# Patient Record
Sex: FEMALE | Race: WHITE | Employment: OTHER | URBAN - METROPOLITAN AREA
[De-identification: names, ages, dates, MRNs, and addresses within clinical notes are randomized per-mention and may not be internally consistent; named-entity substitution may affect disease eponyms.]

---

## 2017-08-17 ENCOUNTER — HOSPITAL ENCOUNTER (OUTPATIENT)
Dept: MAMMOGRAPHY | Age: 72
Discharge: HOME | End: 2017-08-17
Attending: FAMILY MEDICINE
Payer: MEDICARE

## 2017-08-17 DIAGNOSIS — Z12.31 VISIT FOR SCREENING MAMMOGRAM: ICD-10-CM

## 2017-08-17 DIAGNOSIS — Z78.0 ASYMPTOMATIC MENOPAUSAL STATE: ICD-10-CM

## 2017-08-17 PROCEDURE — 77080 DXA BONE DENSITY AXIAL: CPT

## 2017-08-17 PROCEDURE — 77067 SCR MAMMO BI INCL CAD: CPT

## 2019-10-01 LAB — LDL-C, EXTERNAL: 65

## 2020-07-22 RX ORDER — AMLODIPINE BESYLATE 5 MG/1
TABLET ORAL
Qty: 90 TAB | Refills: 0 | Status: SHIPPED | OUTPATIENT
Start: 2020-07-22 | End: 2020-10-15

## 2020-08-04 RX ORDER — VALACYCLOVIR HYDROCHLORIDE 500 MG/1
TABLET, FILM COATED ORAL
Qty: 18 TAB | Refills: 0 | Status: SHIPPED | OUTPATIENT
Start: 2020-08-04 | End: 2020-10-02

## 2020-08-10 RX ORDER — DOXAZOSIN 2 MG/1
TABLET ORAL
Qty: 90 TAB | Refills: 0 | Status: SHIPPED | OUTPATIENT
Start: 2020-08-10 | End: 2020-11-04

## 2020-08-10 NOTE — TELEPHONE ENCOUNTER
Overdue for six-month follow-up of chronic conditions and meds, nonfasting labs. Virtual visit is okay.

## 2020-08-12 ENCOUNTER — OFFICE VISIT (OUTPATIENT)
Dept: FAMILY MEDICINE CLINIC | Age: 75
End: 2020-08-12
Payer: MEDICARE

## 2020-08-12 VITALS
OXYGEN SATURATION: 98 % | HEART RATE: 78 BPM | BODY MASS INDEX: 32.16 KG/M2 | SYSTOLIC BLOOD PRESSURE: 138 MMHG | WEIGHT: 188.38 LBS | DIASTOLIC BLOOD PRESSURE: 70 MMHG | HEIGHT: 64 IN | TEMPERATURE: 98.4 F | RESPIRATION RATE: 16 BRPM

## 2020-08-12 VITALS
HEIGHT: 64 IN | BODY MASS INDEX: 29.84 KG/M2 | SYSTOLIC BLOOD PRESSURE: 162 MMHG | OXYGEN SATURATION: 96 % | DIASTOLIC BLOOD PRESSURE: 90 MMHG | RESPIRATION RATE: 12 BRPM | TEMPERATURE: 97.1 F | HEART RATE: 95 BPM | WEIGHT: 174.8 LBS

## 2020-08-12 DIAGNOSIS — E53.8 COBALAMIN DEFICIENCY: ICD-10-CM

## 2020-08-12 DIAGNOSIS — I10 ESSENTIAL HYPERTENSION: Primary | ICD-10-CM

## 2020-08-12 DIAGNOSIS — K58.9 IRRITABLE BOWEL SYNDROME WITHOUT DIARRHEA: ICD-10-CM

## 2020-08-12 DIAGNOSIS — E03.9 ACQUIRED HYPOTHYROIDISM: ICD-10-CM

## 2020-08-12 DIAGNOSIS — N39.3 STRESS INCONTINENCE OF URINE: ICD-10-CM

## 2020-08-12 PROBLEM — R32 URINARY INCONTINENCE: Status: ACTIVE | Noted: 2020-08-12

## 2020-08-12 PROBLEM — M46.1 INFLAMMATION OF SACROILIAC JOINT (HCC): Status: ACTIVE | Noted: 2017-03-13

## 2020-08-12 PROBLEM — L21.9 SEBORRHEIC DERMATITIS OF SCALP: Status: ACTIVE | Noted: 2020-08-12

## 2020-08-12 PROBLEM — B00.1 HERPES LABIALIS: Status: ACTIVE | Noted: 2020-08-12

## 2020-08-12 PROBLEM — L70.9 ACNE: Status: ACTIVE | Noted: 2020-08-12

## 2020-08-12 PROBLEM — C92.91: Status: ACTIVE | Noted: 2020-08-12

## 2020-08-12 PROBLEM — K29.40 ATROPHIC GASTRITIS: Status: ACTIVE | Noted: 2020-08-12

## 2020-08-12 PROBLEM — M76.829 POSTERIOR TIBIAL TENDINITIS: Status: ACTIVE | Noted: 2017-08-21

## 2020-08-12 PROBLEM — E66.9 OBESITY: Status: ACTIVE | Noted: 2020-08-12

## 2020-08-12 PROBLEM — D51.0 PERNICIOUS ANEMIA: Status: ACTIVE | Noted: 2020-08-12

## 2020-08-12 PROBLEM — N18.30 CHRONIC KIDNEY DISEASE, STAGE 3 (HCC): Status: ACTIVE | Noted: 2020-08-12

## 2020-08-12 PROBLEM — A60.00 GENITAL HERPES SIMPLEX: Status: ACTIVE | Noted: 2020-08-12

## 2020-08-12 PROBLEM — B37.2 CANDIDIASIS OF SKIN: Status: ACTIVE | Noted: 2020-08-12

## 2020-08-12 PROBLEM — M53.3 DISORDER OF SACROILIAC JOINT: Status: ACTIVE | Noted: 2017-03-13

## 2020-08-12 PROBLEM — R01.1 HEART MURMUR: Status: ACTIVE | Noted: 2020-08-12

## 2020-08-12 PROBLEM — L12.9 PEMPHIGOID: Status: ACTIVE | Noted: 2020-08-12

## 2020-08-12 PROCEDURE — 99214 OFFICE O/P EST MOD 30 MIN: CPT | Performed by: NURSE PRACTITIONER

## 2020-08-12 RX ORDER — DASATINIB 100 MG/1
TABLET ORAL
COMMUNITY
Start: 2018-10-22

## 2020-08-12 RX ORDER — OXYBUTYNIN CHLORIDE 5 MG/1
TABLET ORAL
COMMUNITY
Start: 2018-11-10 | End: 2020-10-15

## 2020-08-12 RX ORDER — CLINDAMYCIN HYDROCHLORIDE 300 MG/1
300 CAPSULE ORAL 3 TIMES DAILY
COMMUNITY
End: 2020-08-12

## 2020-08-12 RX ORDER — PAROXETINE HYDROCHLORIDE 20 MG/1
TABLET, FILM COATED ORAL
COMMUNITY
End: 2022-02-09 | Stop reason: ALTCHOICE

## 2020-08-12 RX ORDER — CALCIUM CARB/VITAMIN D3/VIT K1 500-100-40
TABLET,CHEWABLE ORAL
COMMUNITY

## 2020-08-12 NOTE — PROGRESS NOTES
Subjective  Chief Complaint   Patient presents with    Follow Up Chronic Condition    Medication Evaluation     HPI:  Cristofer Hunter is a 66 y.o. female. Patient presents for management of hypertension, hypothyroidism, vit B12 deficiency, IBS, and urinary incontinence. Medications reviewed, taking as prescribed with no known side effects. Reported home BP readings 130s/70s. Not exercising regularly. Limits salt, drinks mostly water. Levothyroxine daily in am on empty stomach, alone with sip of water. Continues with self injection of B12 first of every month. IBS is well controlled with Bentyl once daily. Urinary incontinence is well controlled with daily medication. Follows with oncology for Paxil and Sprycel.     Past Medical History:   Diagnosis Date    Acne 8/12/2020    Acquired hypothyroidism 8/12/2020    Atrophic gastritis 8/12/2020    Candidiasis of skin 8/12/2020    Chronic kidney disease, stage 3 ([facility] [address] 8/12/2020    Cobalamin deficiency 8/12/2020    Essential hypertension 8/12/2020    Fracture     left hip/ coccyx    Genital herpes simplex 8/12/2020    Heart murmur 8/12/2020    Herpes labialis 8/12/2020    Hypertension     Hypothyroid     Irritable bowel syndrome 8/12/2020    Myeloid leukemia in remission ([facility] [address] 8/12/2020    Obesity 8/12/2020    Pemphigoid 8/12/2020    Pernicious anemia 8/12/2020    Seborrheic dermatitis of scalp 8/12/2020    Urinary incontinence 8/12/2020     Family History   Problem Relation Age of Onset    Breast Cancer Sister     Hypertension Sister     Diabetes Mother     Cancer Father      Social History     Socioeconomic History    Marital status:      Spouse name: Not on file    Number of children: Not on file    Years of education: Not on file    Highest education level: Not on file   Occupational History    Not on file   Social Needs    Financial resource strain: Not on file    Food insecurity     Worry: Not on file     Inability: Not on file   Can'tWait needs     Medical: Not on file     Non-medical: Not on file   Tobacco Use    Smoking status: Never Smoker    Smokeless tobacco: Never Used   Substance and Sexual Activity    Alcohol use: Not Currently    Drug use: No    Sexual activity: Not Currently   Lifestyle    Physical activity     Days per week: Not on file     Minutes per session: Not on file    Stress: Not on file   Relationships    Social connections     Talks on phone: Not on file     Gets together: Not on file     Attends [employer] service: Not on file     Active member of club or organization: Not on file     Attends meetings of clubs or organizations: Not on file     Relationship status: Not on file    Intimate partner violence     Fear of current or ex partner: Not on file     Emotionally abused: Not on file     Physically abused: Not on file     Forced sexual activity: Not on file   Other Topics Concern    Not on file   Social History Narrative    Not on file     Current Outpatient Medications on File Prior to Visit   Medication Sig Dispense Refill    dasatinib (SpryceL) 100 mg tab Sprycel 100 mg tablet      oxybutynin (DITROPAN) 5 mg tablet TK 1 T PO BID FOR URINARY CONTINENCE      Insulin Syringe-Needle U-100 1 mL 31 gauge x 5/16 syrg insulin syringe U-100 with needle 1 mL 31 gauge x 5/16\"      PARoxetine (PAXIL) 20 mg tablet paroxetine 20 mg tablet      [DISCONTINUED] clindamycin (CLEOCIN) 300 mg capsule Take 300 mg by mouth three (3) times daily.  doxazosin (CARDURA) 2 mg tablet TAKE 1 TABLET BY MOUTH EVERY NIGHT AT BEDTIME 90 Tab 0    valACYclovir (VALTREX) 500 mg tablet TAKE 1 TABLET BY MOUTH TWICE DAILY FOR 3 DAYS. TOTAL OF 6 DOSES PER OUTBREAK 18 Tab 0    amLODIPine (NORVASC) 5 mg tablet TAKE 1 TABLET BY MOUTH EVERY DAY 90 Tab 0    levothyroxine (SYNTHROID) 125 mcg tablet Take 125 mcg by mouth daily (before breakfast).       dicyclomine (BENTYL) 10 mg capsule Take 10 mg by mouth two (2) times a day.      cyanocobalamin (VITAMIN B-12) 1,000 mcg/mL injection 1,000 mcg by IntraMUSCular route every thirty (30) days.  [DISCONTINUED] liothyronine (CYTOMEL) 25 mcg tablet Take 12.5 mcg by mouth as directed. Every other day       [DISCONTINUED] citalopram (CELEXA) 20 mg tablet Take 10 mg by mouth daily.  [DISCONTINUED] lorazepam (ATIVAN) 0.5 mg tablet Take 0.5 mg by mouth daily.  [DISCONTINUED] gabapentin (NEURONTIN) 300 mg capsule Take 100 mg by mouth three (3) times daily.  [DISCONTINUED] oxycodone-acetaminophen (PERCOCET) 5-325 mg per tablet Take 1 Tab by mouth two (2) times a day.  [DISCONTINUED] CETIRIZINE HCL (ZYRTEC PO) Take 1 Tab by mouth daily.  [DISCONTINUED] cholecalciferol, vitamin d3, (VITAMIN D) 1,000 unit tablet Take 1,000 Units by mouth daily.  [DISCONTINUED] TRIAMTERENE PO Take 37.5 mg by mouth daily.  [DISCONTINUED] prednisone (STERAPRED DS) 10 mg dose pack Take  by mouth. See administration instruction per 10mg dose pack 48 Tab 0     No current facility-administered medications on file prior to visit. Allergies   Allergen Reactions    Adacel [Diphth,Pertus(Acell),Tetanus] Other (comments)     Arm swelling; redness    Pcn [Penicillins] Itching     Review of Systems   Constitutional: Negative for chills, fever and weight loss. HENT: Negative for ear pain, hearing loss and sore throat. Denies difficulty swallowing. Eyes: Negative for blurred vision. Respiratory: Negative for cough, shortness of breath and wheezing. Cardiovascular: Negative for chest pain, palpitations, claudication and leg swelling. Gastrointestinal: Negative for abdominal pain, constipation, diarrhea and heartburn. Genitourinary: Negative for dysuria. Musculoskeletal: Negative for joint pain and myalgias. Neurological: Negative for dizziness, tingling, weakness and headaches. Psychiatric/Behavioral: Negative for depression.  The patient is not nervous/anxious. Objective  Physical Exam  Vitals signs and nursing note reviewed. Constitutional:       General: She is not in acute distress. Appearance: Normal appearance. She is obese. HENT:      Head: Normocephalic. Eyes:      Extraocular Movements: Extraocular movements intact. Neck:      Musculoskeletal: Neck supple. Thyroid: No thyroid mass, thyromegaly or thyroid tenderness. Cardiovascular:      Rate and Rhythm: Normal rate and regular rhythm. Heart sounds: Murmur present. Pulmonary:      Effort: Pulmonary effort is normal.      Breath sounds: Normal breath sounds. Musculoskeletal: Normal range of motion. Right lower leg: No edema. Left lower leg: No edema. Lymphadenopathy:      Cervical: No cervical adenopathy. Upper Body:      Right upper body: No supraclavicular adenopathy. Left upper body: No supraclavicular adenopathy. Skin:     General: Skin is warm and dry. Neurological:      Mental Status: She is alert and oriented to person, place, and time. Psychiatric:         Mood and Affect: Mood normal.         Behavior: Behavior normal.          Assessment & Plan      ICD-10-CM ICD-9-CM    1. Essential hypertension  I10 401.9 LIPID PANEL      METABOLIC PANEL, COMPREHENSIVE   2. Acquired hypothyroidism  E03.9 244.9 TSH 3RD GENERATION   3. Cobalamin deficiency  E53.8 266.2 VITAMIN B12   4. Irritable bowel syndrome without diarrhea  K58.9 564.1    5. Stress incontinence of urine  N39.3 [id number]      Diagnoses and all orders for this visit:    1. Essential hypertension  BP is above goal in the office today. She is going to check some readings outside of the office after taking medication and after sitting quietly for 5 minutes with both feet flat on the floor. Advised to call if readings are consistently greater than 150/90 on either number.  -     LIPID PANEL  -     METABOLIC PANEL, COMPREHENSIVE    2.  Acquired hypothyroidism  We are checking annual labs today. Take med daily at the same time on an empty stomach, at least 30 minutes before or two hours after a meal, and separate from other meds including OTC, minerals, and vitamins by at least 2 hours. -     TSH 3RD GENERATION    3. Cobalamin deficiency  Checking annual labs. Continue injections as currently ordered until results are received. -     VITAMIN B12    4. Irritable bowel syndrome without diarrhea  Well controlled with daily medication. 5. Stress incontinence of urine  Well-controlled with daily medication. Follow-up and Dispositions    · Return in about 2 months (around 10/12/2020) for Varsha Blue ([address]            Refugia Juventino, SILVINA

## 2020-08-13 ENCOUNTER — TELEPHONE (OUTPATIENT)
Dept: FAMILY MEDICINE CLINIC | Age: 75
End: 2020-08-13

## 2020-08-13 DIAGNOSIS — E03.9 ACQUIRED HYPOTHYROIDISM: Primary | ICD-10-CM

## 2020-08-13 LAB
ALBUMIN SERPL-MCNC: 4.4 G/DL (ref 3.7–4.7)
ALBUMIN/GLOB SERPL: 1.9 {RATIO} (ref 1.2–2.2)
ALP SERPL-CCNC: 111 IU/L (ref 39–117)
ALT SERPL-CCNC: 9 IU/L (ref 0–32)
AST SERPL-CCNC: 26 IU/L (ref 0–40)
BILIRUB SERPL-MCNC: 0.3 MG/DL (ref 0–1.2)
BUN SERPL-MCNC: 16 MG/DL (ref 8–27)
BUN/CREAT SERPL: 15 (ref 12–28)
CALCIUM SERPL-MCNC: 9.5 MG/DL (ref 8.7–10.3)
CHLORIDE SERPL-SCNC: 103 MMOL/L (ref 96–106)
CHOLEST SERPL-MCNC: 161 MG/DL (ref 100–199)
CO2 SERPL-SCNC: 25 MMOL/L (ref 20–29)
CREAT SERPL-MCNC: 1.09 MG/DL (ref 0.57–1)
GLOBULIN SER CALC-MCNC: 2.3 G/DL (ref 1.5–4.5)
GLUCOSE SERPL-MCNC: 110 MG/DL (ref 65–99)
HDLC SERPL-MCNC: 73 MG/DL
LDLC SERPL CALC-MCNC: 72 MG/DL (ref 0–99)
POTASSIUM SERPL-SCNC: 4.4 MMOL/L (ref 3.5–5.2)
PROT SERPL-MCNC: 6.7 G/DL (ref 6–8.5)
SODIUM SERPL-SCNC: 139 MMOL/L (ref 134–144)
TRIGL SERPL-MCNC: 81 MG/DL (ref 0–149)
TSH SERPL DL<=0.005 MIU/L-ACNC: 5.11 UIU/ML (ref 0.45–4.5)
VIT B12 SERPL-MCNC: 504 PG/ML (ref 232–1245)
VLDLC SERPL CALC-MCNC: 16 MG/DL (ref 5–40)

## 2020-08-13 RX ORDER — LEVOTHYROXINE SODIUM 100 UG/1
100 TABLET ORAL
Qty: 60 TAB | Refills: 0 | Status: SHIPPED | OUTPATIENT
Start: 2020-08-13 | End: 2020-10-13

## 2020-08-13 NOTE — PROGRESS NOTES
Lipids are all below goal.  Glucose mildly elevated, which she fasting yesterday? Kidney function remains slightly low but stable. In order to protect the kidneys be sure to drink water regularly, avoid salt in diet, keep blood pressure in normal range, and use NSAIDs such as ibuprofen and Naproxen sparingly. She is receiving too much thyroid medication. Please verify her dose and I will send a lower dose to her pharmacy. Recheck thyroid function in 2 months.

## 2020-08-13 NOTE — TELEPHONE ENCOUNTER
----- Message from Renetta Torrez, Cinthia Curtis sent at 8/13/2020  2:24 PM EDT -----  She is currently taking 0.112mcg  ----- Message -----  From: Claire Harrison NP  Sent: 8/13/2020   1:16 PM EDT  To: [facility] Nurse    Lipids are all below goal.  Glucose mildly elevated, which she fasting yesterday? Kidney function remains slightly low but stable. In order to protect the kidneys be sure to drink water regularly, avoid salt in diet, keep blood pressure in normal range, and use NSAIDs such as ibuprofen and Naproxen sparingly. She is receiving too much thyroid medication. Please verify her dose and I will send a lower dose to her pharmacy. Recheck thyroid function in 2 months.

## 2020-08-31 RX ORDER — CYANOCOBALAMIN 1000 UG/ML
INJECTION, SOLUTION INTRAMUSCULAR; SUBCUTANEOUS
Qty: 3 ML | Refills: 3 | Status: SHIPPED | OUTPATIENT
Start: 2020-08-31 | End: 2021-09-03

## 2020-10-02 RX ORDER — VALACYCLOVIR HYDROCHLORIDE 500 MG/1
TABLET, FILM COATED ORAL
Qty: 18 TAB | Refills: 0 | Status: SHIPPED | OUTPATIENT
Start: 2020-10-02 | End: 2021-06-10

## 2020-10-12 DIAGNOSIS — E03.9 ACQUIRED HYPOTHYROIDISM: ICD-10-CM

## 2020-10-13 DIAGNOSIS — E03.9 ACQUIRED HYPOTHYROIDISM: ICD-10-CM

## 2020-10-13 RX ORDER — LEVOTHYROXINE SODIUM 100 UG/1
TABLET ORAL
Qty: 60 TAB | Refills: 0 | Status: SHIPPED | OUTPATIENT
Start: 2020-10-13 | End: 2020-10-13

## 2020-10-13 RX ORDER — LEVOTHYROXINE SODIUM 100 UG/1
TABLET ORAL
Qty: 90 TAB | Refills: 0 | Status: SHIPPED | OUTPATIENT
Start: 2020-10-13 | End: 2020-11-04 | Stop reason: SDUPTHER

## 2020-10-15 RX ORDER — OXYBUTYNIN CHLORIDE 5 MG/1
TABLET ORAL
Qty: 180 TAB | Refills: 3 | Status: SHIPPED | OUTPATIENT
Start: 2020-10-15

## 2020-10-15 RX ORDER — AMLODIPINE BESYLATE 5 MG/1
TABLET ORAL
Qty: 90 TAB | Refills: 0 | Status: SHIPPED | OUTPATIENT
Start: 2020-10-15 | End: 2021-01-13

## 2020-10-21 ENCOUNTER — TELEPHONE (OUTPATIENT)
Dept: FAMILY MEDICINE CLINIC | Age: 75
End: 2020-10-21

## 2020-10-23 DIAGNOSIS — R01.1 MURMUR: Primary | ICD-10-CM

## 2020-10-23 RX ORDER — CLINDAMYCIN HYDROCHLORIDE 300 MG/1
CAPSULE ORAL
Qty: 2 CAP | Refills: 0 | Status: SHIPPED | OUTPATIENT
Start: 2020-10-23 | End: 2020-11-03

## 2020-10-31 VITALS
TEMPERATURE: 98.4 F | BODY MASS INDEX: 32.16 KG/M2 | HEIGHT: 64 IN | SYSTOLIC BLOOD PRESSURE: 158 MMHG | WEIGHT: 188.38 LBS | HEART RATE: 96 BPM | OXYGEN SATURATION: 98 % | DIASTOLIC BLOOD PRESSURE: 70 MMHG

## 2020-11-03 ENCOUNTER — OFFICE VISIT (OUTPATIENT)
Dept: FAMILY MEDICINE CLINIC | Age: 75
End: 2020-11-03
Payer: MEDICARE

## 2020-11-03 VITALS
DIASTOLIC BLOOD PRESSURE: 60 MMHG | SYSTOLIC BLOOD PRESSURE: 132 MMHG | OXYGEN SATURATION: 97 % | WEIGHT: 179.5 LBS | HEIGHT: 64 IN | HEART RATE: 92 BPM | BODY MASS INDEX: 30.64 KG/M2 | TEMPERATURE: 97.5 F

## 2020-11-03 DIAGNOSIS — K58.9 IRRITABLE BOWEL SYNDROME WITHOUT DIARRHEA: ICD-10-CM

## 2020-11-03 DIAGNOSIS — E03.9 ACQUIRED HYPOTHYROIDISM: ICD-10-CM

## 2020-11-03 DIAGNOSIS — D51.0 PERNICIOUS ANEMIA: ICD-10-CM

## 2020-11-03 DIAGNOSIS — R32 URINARY INCONTINENCE, UNSPECIFIED TYPE: ICD-10-CM

## 2020-11-03 DIAGNOSIS — I10 ESSENTIAL HYPERTENSION: Primary | ICD-10-CM

## 2020-11-03 DIAGNOSIS — Z28.20 VACCINE REFUSED BY PATIENT: ICD-10-CM

## 2020-11-03 DIAGNOSIS — B00.1 HERPES LABIALIS: ICD-10-CM

## 2020-11-03 DIAGNOSIS — E66.09 CLASS 1 OBESITY DUE TO EXCESS CALORIES WITH SERIOUS COMORBIDITY AND BODY MASS INDEX (BMI) OF 30.0 TO 30.9 IN ADULT: ICD-10-CM

## 2020-11-03 DIAGNOSIS — F41.8 MIXED ANXIETY AND DEPRESSIVE DISORDER: ICD-10-CM

## 2020-11-03 DIAGNOSIS — Z98.818 OTHER DENTAL PROCEDURE STATUS: ICD-10-CM

## 2020-11-03 PROBLEM — M46.1 INFLAMMATION OF SACROILIAC JOINT (HCC): Status: RESOLVED | Noted: 2017-03-13 | Resolved: 2020-11-03

## 2020-11-03 PROBLEM — L12.9 PEMPHIGOID: Status: RESOLVED | Noted: 2020-08-12 | Resolved: 2020-11-03

## 2020-11-03 PROBLEM — M53.3 DISORDER OF SACROILIAC JOINT: Status: RESOLVED | Noted: 2017-03-13 | Resolved: 2020-11-03

## 2020-11-03 PROBLEM — M76.829 POSTERIOR TIBIAL TENDINITIS: Status: RESOLVED | Noted: 2017-08-21 | Resolved: 2020-11-03

## 2020-11-03 PROCEDURE — 99214 OFFICE O/P EST MOD 30 MIN: CPT | Performed by: NURSE PRACTITIONER

## 2020-11-03 RX ORDER — LORAZEPAM 0.5 MG/1
0.5 TABLET ORAL
Qty: 15 TAB | Refills: 0 | Status: SHIPPED | OUTPATIENT
Start: 2020-11-03 | End: 2021-06-08 | Stop reason: SDUPTHER

## 2020-11-03 NOTE — PROGRESS NOTES
Subjective  Chief Complaint   Patient presents with    Follow Up Chronic Condition     patient does not want medicare wellness     HPI:  Zana Coello is a 66 y.o. female. Patient was on the schedule for Medicare wellness today however she declines further Medicare wellness exams. Instead we will complete her annual management of all of her chronic conditions including HTN, pernicious anemia, IBS, urinary incontinence, hypothyroidism, and herpes labialis. Medications reviewed, taking as prescribed with no side effects. HTN- reported home BP readings 130s/60s. Limits salt, stays hydrated, and walking for exercise. Pernicious anemia- Continues to self administer N26 without complication. IBS- well controlled with Bentyl once daily. Hypothyroidism- thyroid med in am alone with sip of water, no thyroid concerns. Herpes- well controlled with prn med for flares. Urinary incontinence- well controlled with daily med. Does  Depression- well controlled with Paxil daily. Requesting refill of Lorazepam as previously prescribed by oncology for anxiety. Received flu vaccine from pharmacy in September.      Past Medical History:   Diagnosis Date    Acquired hypothyroidism     Atrophic gastritis     Cobalamin deficiency     Essential hypertension     Fracture     left hip/ coccyx    Genital herpes simplex     Heart murmur     Herpes labialis     Hypertension     Irritable bowel syndrome     Myeloid leukemia in remission ([facility] [address]     Pemphigoid     Pernicious anemia     Seborrheic dermatitis of scalp     Urinary incontinence      Family History   Problem Relation Age of Onset    Breast Cancer Sister     Hypertension Sister     Diabetes Mother     Cancer Father     Other Brother         leukemia     Social History     Socioeconomic History    Marital status:      Spouse name: Not on file    Number of children: Not on file    Years of education: Not on file    Highest education level: Not on file   Occupational History    Not on file   Social Needs    Financial resource strain: Not on file    Food insecurity     Worry: Not on file     Inability: Not on file    Transportation needs     Medical: Not on file     Non-medical: Not on file   Tobacco Use    Smoking status: Never Smoker    Smokeless tobacco: Never Used   Substance and Sexual Activity    Alcohol use: Not Currently    Drug use: No    Sexual activity: Not Currently   Lifestyle    Physical activity     Days per week: Not on file     Minutes per session: Not on file    Stress: Not on file   Relationships    Social connections     Talks on phone: Not on file     Gets together: Not on file     Attends [employer] service: Not on file     Active member of club or organization: Not on file     Attends meetings of clubs or organizations: Not on file     Relationship status: Not on file    Intimate partner violence     Fear of current or ex partner: Not on file     Emotionally abused: Not on file     Physically abused: Not on file     Forced sexual activity: Not on file   Other Topics Concern    Not on file   Social History Narrative    Not on file     Current Outpatient Medications on File Prior to Visit   Medication Sig Dispense Refill    amLODIPine (NORVASC) 5 mg tablet TAKE 1 TABLET BY MOUTH EVERY DAY 90 Tab 0    oxybutynin (DITROPAN) 5 mg tablet TAKE 1 TABLET BY MOUTH TWICE DAILY FOR URINARY CONTINENCE 180 Tab 3    levothyroxine (SYNTHROID) 100 mcg tablet TAKE 1 TABLET BY MOUTH DAILY BEFORE BREAKFAST 90 Tab 0    valACYclovir (VALTREX) 500 mg tablet TAKE 1 TABLET BY MOUTH TWICE DAILY FOR 3 DAYS.  MAX 6 DOSES PER OUTBREAK 18 Tab 0    cyanocobalamin (VITAMIN B12) 1,000 mcg/mL injection ADMINISTER 1 ML IN THE MUSCLE 1 TIME MONTHLY AS DIRECTED 3 mL 3    Insulin Syringe-Needle U-100 1 mL 31 gauge x 5/16 syrg insulin syringe U-100 with needle 1 mL 31 gauge x 5/16\"      dasatinib (SpryceL) 100 mg tab Sprycel 100 mg tablet  PARoxetine (PAXIL) 20 mg tablet paroxetine 20 mg tablet      doxazosin (CARDURA) 2 mg tablet TAKE 1 TABLET BY MOUTH EVERY NIGHT AT BEDTIME 90 Tab 0    dicyclomine (BENTYL) 10 mg capsule Take 10 mg by mouth two (2) times a day.  [DISCONTINUED] clindamycin (CLEOCIN) 300 mg capsule Take 2 capsules 1 hour prior to procedure 2 Cap 0     No current facility-administered medications on file prior to visit. Allergies   Allergen Reactions    Adacel [Diphth,Pertus(Acell),Tetanus] Other (comments)     Arm swelling; redness    Pcn [Penicillins] Itching     Review of Systems   Constitutional: Negative for chills, fever and weight loss. HENT: Negative for congestion, ear pain, hearing loss, sinus pain and sore throat. Denies difficulty swallowing. Eyes: Negative for blurred vision. Respiratory: Negative for cough, shortness of breath and wheezing. Cardiovascular: Negative for chest pain, palpitations, claudication and leg swelling. Gastrointestinal: Negative for abdominal pain, constipation, diarrhea and heartburn. Genitourinary: Negative for dysuria. Musculoskeletal: Negative for joint pain and myalgias. Neurological: Negative for dizziness, tingling, weakness and headaches. Psychiatric/Behavioral: Negative for depression. The patient is not nervous/anxious. Objective  Physical Exam  Vitals signs and nursing note reviewed. Constitutional:       General: She is not in acute distress. Appearance: Normal appearance. She is obese. HENT:      Head: Normocephalic. Eyes:      Extraocular Movements: Extraocular movements intact. Neck:      Musculoskeletal: Neck supple. Thyroid: No thyroid mass, thyromegaly or thyroid tenderness. Cardiovascular:      Rate and Rhythm: Normal rate and regular rhythm. Heart sounds: Normal heart sounds. Pulmonary:      Effort: Pulmonary effort is normal.      Breath sounds: Normal breath sounds.    Musculoskeletal: Normal range of motion. Right lower leg: No edema. Left lower leg: No edema. Lymphadenopathy:      Cervical: No cervical adenopathy. Upper Body:      Right upper body: No supraclavicular adenopathy. Left upper body: No supraclavicular adenopathy. Skin:     General: Skin is warm and dry. Neurological:      Mental Status: She is alert and oriented to person, place, and time. Psychiatric:         Mood and Affect: Mood normal.         Behavior: Behavior normal.          Assessment & Plan      ICD-10-CM ICD-9-CM    1. Essential hypertension  B97 256.0 METABOLIC PANEL, COMPREHENSIVE   2. Pernicious anemia  D51.0 281.0 VITAMIN B12   3. Irritable bowel syndrome without diarrhea  K58.9 564.1    4. Acquired hypothyroidism  E03.9 244.9 TSH 3RD GENERATION   5. Herpes labialis  B00.1 054.9    6. Urinary incontinence, unspecified type  R32 788.30    7. Mixed anxiety and depressive disorder  F41.8 300.4 LORazepam (ATIVAN) 0.5 mg tablet   8. Other dental procedure status  Z98.818 V45.84    9. Class 1 obesity due to excess calories with serious comorbidity and body mass index (BMI) of 30.0 to 30.9 in adult  E66.09 278.00     Z68.30 V85.30    10. Vaccine refused by patient  Z28.20 V64.09      Diagnoses and all orders for this visit:    1. Essential hypertension  BP is below goal in the office today and on reported home readings. No medication changes. Continue to check readings regularly and call if consistently greater than 150/90 on either number.  -     METABOLIC PANEL, COMPREHENSIVE    2. Pernicious anemia  Checking annual level today. Continues to self administer at home. -     VITAMIN B12    3. Irritable bowel syndrome without diarrhea  Well controlled with Bentyl once daily. 4. Acquired hypothyroidism  Checking annual labs today. Continue take medication alone in the morning with a sip of water.  -     TSH 3RD GENERATION    5.  Herpes labialis  Well-controlled with as needed medication for flares. 6. Urinary incontinence, unspecified type  Well-controlled with daily medication. She is going to stop this and see how well controlled symptoms are off medication. 7. Mixed anxiety and depressive disorder  Depression is well controlled with daily medication. Lorazepam as ordered-we reviewed office policy for as needed use only. Understands if this is needed for daily use we will not let be able to prescribe. -     LORazepam (ATIVAN) 0.5 mg tablet; Take 1 Tab by mouth every eight (8) hours as needed for Anxiety. Max Daily Amount: 1.5 mg.    8. Other dental procedure status  We reviewed indications for dental prophylaxis. She denies joint and valve replacement and personal history of infective endocarditis. Advised to discuss further clindamycin orders with dentist if they feel this is needed. 9. Class 1 obesity due to excess calories with serious comorbidity and body mass index (BMI) of 30.0 to 30.9 in adult  Increase regular exercise and eat a healthy diet. 10. Vaccine refused by patient  Declines Shingrix vaccine. Follow-up and Dispositions    · Return in about 6 months (around 5/3/2021) for follow up, hypertension, nonfasting.            Emily Ascencio NP

## 2020-11-04 DIAGNOSIS — E03.9 ACQUIRED HYPOTHYROIDISM: ICD-10-CM

## 2020-11-04 LAB
ALBUMIN SERPL-MCNC: 4.7 G/DL (ref 3.7–4.7)
ALBUMIN/GLOB SERPL: 2 {RATIO} (ref 1.2–2.2)
ALP SERPL-CCNC: 108 IU/L (ref 39–117)
ALT SERPL-CCNC: 12 IU/L (ref 0–32)
AST SERPL-CCNC: 26 IU/L (ref 0–40)
BILIRUB SERPL-MCNC: 0.5 MG/DL (ref 0–1.2)
BUN SERPL-MCNC: 14 MG/DL (ref 8–27)
BUN/CREAT SERPL: 13 (ref 12–28)
CALCIUM SERPL-MCNC: 9.2 MG/DL (ref 8.7–10.3)
CHLORIDE SERPL-SCNC: 101 MMOL/L (ref 96–106)
CO2 SERPL-SCNC: 21 MMOL/L (ref 20–29)
CREAT SERPL-MCNC: 1.11 MG/DL (ref 0.57–1)
GLOBULIN SER CALC-MCNC: 2.3 G/DL (ref 1.5–4.5)
GLUCOSE SERPL-MCNC: 90 MG/DL (ref 65–99)
POTASSIUM SERPL-SCNC: 4.6 MMOL/L (ref 3.5–5.2)
PROT SERPL-MCNC: 7 G/DL (ref 6–8.5)
SODIUM SERPL-SCNC: 136 MMOL/L (ref 134–144)
TSH SERPL DL<=0.005 MIU/L-ACNC: 11.7 UIU/ML (ref 0.45–4.5)
VIT B12 SERPL-MCNC: 1112 PG/ML (ref 232–1245)

## 2020-11-04 RX ORDER — LEVOTHYROXINE SODIUM 125 UG/1
125 TABLET ORAL
Qty: 90 TAB | Refills: 0 | Status: SHIPPED | OUTPATIENT
Start: 2020-11-04 | End: 2021-03-04

## 2021-01-14 RX ORDER — DICYCLOMINE HYDROCHLORIDE 10 MG/1
CAPSULE ORAL
Qty: 90 CAP | Refills: 3 | Status: SHIPPED | OUTPATIENT
Start: 2021-01-14 | End: 2022-01-11

## 2021-02-16 DIAGNOSIS — E03.9 ACQUIRED HYPOTHYROIDISM: Primary | ICD-10-CM

## 2021-02-17 LAB — TSH SERPL DL<=0.005 MIU/L-ACNC: 0.64 UIU/ML (ref 0.45–4.5)

## 2021-03-03 ENCOUNTER — TELEPHONE (OUTPATIENT)
Dept: FAMILY MEDICINE CLINIC | Age: 76
End: 2021-03-03

## 2021-05-24 ENCOUNTER — OFFICE VISIT (OUTPATIENT)
Dept: FAMILY MEDICINE CLINIC | Age: 76
End: 2021-05-24
Payer: MEDICARE

## 2021-05-24 VITALS
TEMPERATURE: 97.8 F | OXYGEN SATURATION: 98 % | DIASTOLIC BLOOD PRESSURE: 50 MMHG | RESPIRATION RATE: 18 BRPM | WEIGHT: 184.8 LBS | SYSTOLIC BLOOD PRESSURE: 140 MMHG | HEART RATE: 68 BPM | BODY MASS INDEX: 31.55 KG/M2 | HEIGHT: 64 IN

## 2021-05-24 DIAGNOSIS — L21.9 SEBORRHEIC DERMATITIS OF SCALP: Primary | ICD-10-CM

## 2021-05-24 PROCEDURE — G8432 DEP SCR NOT DOC, RNG: HCPCS | Performed by: NURSE PRACTITIONER

## 2021-05-24 PROCEDURE — 1090F PRES/ABSN URINE INCON ASSESS: CPT | Performed by: NURSE PRACTITIONER

## 2021-05-24 PROCEDURE — 1101F PT FALLS ASSESS-DOCD LE1/YR: CPT | Performed by: NURSE PRACTITIONER

## 2021-05-24 PROCEDURE — G8427 DOCREV CUR MEDS BY ELIG CLIN: HCPCS | Performed by: NURSE PRACTITIONER

## 2021-05-24 PROCEDURE — G8753 SYS BP > OR = 140: HCPCS | Performed by: NURSE PRACTITIONER

## 2021-05-24 PROCEDURE — G8536 NO DOC ELDER MAL SCRN: HCPCS | Performed by: NURSE PRACTITIONER

## 2021-05-24 PROCEDURE — G8754 DIAS BP LESS 90: HCPCS | Performed by: NURSE PRACTITIONER

## 2021-05-24 PROCEDURE — G8399 PT W/DXA RESULTS DOCUMENT: HCPCS | Performed by: NURSE PRACTITIONER

## 2021-05-24 PROCEDURE — G8417 CALC BMI ABV UP PARAM F/U: HCPCS | Performed by: NURSE PRACTITIONER

## 2021-05-24 PROCEDURE — 99214 OFFICE O/P EST MOD 30 MIN: CPT | Performed by: NURSE PRACTITIONER

## 2021-05-24 RX ORDER — PREDNISONE 20 MG/1
20 TABLET ORAL
Qty: 10 TABLET | Refills: 1 | Status: SHIPPED | OUTPATIENT
Start: 2021-05-24 | End: 2022-02-09 | Stop reason: ALTCHOICE

## 2021-05-24 NOTE — PROGRESS NOTES
Chief Complaint   Patient presents with    Hair/Scalp Problem     has dermatitis on her scalp. left T-Fall on her scalp and it broke it out     1. Have you been to the ER, urgent care clinic since your last visit? Hospitalized since your last visit? No    2. Have you seen or consulted any other health care providers outside of the [address] [place] [address] since your last visit? Include any pap smears or colon screening.  No    Visit Vitals  BP (!) 140/50 (BP 1 Location: Left upper arm, BP Patient Position: Sitting, BP Cuff Size: Adult)   Pulse 68   Temp 97.8 °F (36.6 °C) (Temporal)   Resp 18   Ht 5' 4\" (1.626 m)   Wt 184 lb 12.8 oz (83.8 kg)   SpO2 98%   BMI 31.72 kg/m²     3 most recent PHQ Screens 5/24/2021   Little interest or pleasure in doing things Several days   Feeling down, depressed, irritable, or hopeless Not at all   Total Score PHQ 2 1

## 2021-05-24 NOTE — PROGRESS NOTES
Subjective  Chief Complaint   Patient presents with    Hair/Scalp Problem     has dermatitis on her scalp. left T-Fall on her scalp and it broke it out     HPI:  Bony Lopez is a 64 y.o. female. 63 yo female presents for lesions in her scalp after using an OTC shampoo for her seborrhea dermatitis last week and she has developed 2 cm lesions on her scalp x 3. They are painful and she sleeps on her back and that position exacerbates her discomfort. She has not found anything that makes it worse. She did try to call her dermatologist ([facility]) and they cannot see her until July.       Past Medical History:   Diagnosis Date    Acquired hypothyroidism     Atrophic gastritis     Cobalamin deficiency     Essential hypertension     Fracture     left hip/ coccyx    Genital herpes simplex     Heart murmur     Herpes labialis     Hypertension     Irritable bowel syndrome     Myeloid [facility] in remission ([facility] [address]     Pemphigoid     Pernicious anemia     Seborrheic dermatitis of scalp     Urinary incontinence      Family History   Problem Relation Age of Onset    Breast Cancer Sister     Hypertension Sister     Diabetes Mother     Cancer Father     Other Brother         leukemia     Social History     Socioeconomic History    Marital status:      Spouse name: Not on file    Number of children: Not on file    Years of education: Not on file    Highest education level: Not on file   Occupational History    Not on file   Tobacco Use    Smoking status: Never Smoker    Smokeless tobacco: Never Used   Vaping Use    Vaping Use: Never used   Substance and Sexual Activity    Alcohol use: Not Currently    Drug use: No    Sexual activity: Not Currently     Partners: Male   Other Topics Concern    Not on file   Social History Narrative    Not on file     Social Determinants of Health     Financial Resource Strain:     Difficulty of Paying Living Expenses:    Food Insecurity:     Worried About Running Out of Food in the Last Year:     [address] in the Last Year:    Transportation Needs:     Lack of Transportation (Medical):  Lack of Transportation (Non-Medical):    Physical Activity:     Days of Exercise per Week:     Minutes of Exercise per Session:    Stress:     Feeling of Stress :    Social Connections:     Frequency of Communication with Friends and Family:     Frequency of Social Gatherings with Friends and Family:     Attends Gnosticism Services:     Active Member of Clubs or Organizations:     Attends Club or Organization Meetings:     Marital Status:    Intimate Partner Violence:     Fear of Current or Ex-Partner:     Emotionally Abused:     Physically Abused:     Sexually Abused:      Current Outpatient Medications on File Prior to Visit   Medication Sig Dispense Refill    levothyroxine (SYNTHROID) 125 mcg tablet TAKE 1 TABLET BY MOUTH DAILY BEFORE BREAKFAST 90 Tab 1    dicyclomine (BENTYL) 10 mg capsule TAKE 1 CAPSULE BY MOUTH DAILY 90 Cap 3    amLODIPine (NORVASC) 5 mg tablet TAKE 1 TABLET BY MOUTH EVERY DAY 90 Tab 1    doxazosin (CARDURA) 2 mg tablet TAKE 1 TABLET BY MOUTH EVERY NIGHT AT BEDTIME 90 Tab 1    LORazepam (ATIVAN) 0.5 mg tablet Take 1 Tab by mouth every eight (8) hours as needed for Anxiety. Max Daily Amount: 1.5 mg. 15 Tab 0    oxybutynin (DITROPAN) 5 mg tablet TAKE 1 TABLET BY MOUTH TWICE DAILY FOR URINARY CONTINENCE 180 Tab 3    valACYclovir (VALTREX) 500 mg tablet TAKE 1 TABLET BY MOUTH TWICE DAILY FOR 3 DAYS.  MAX 6 DOSES PER OUTBREAK 18 Tab 0    cyanocobalamin (VITAMIN B12) 1,000 mcg/mL injection ADMINISTER 1 ML IN THE MUSCLE 1 TIME MONTHLY AS DIRECTED 3 mL 3    Insulin Syringe-Needle U-100 1 mL 31 gauge x 5/16 syrg insulin syringe U-100 with needle 1 mL 31 gauge x 5/16\"      dasatinib (SpryceL) 100 mg tab Sprycel 100 mg tablet      PARoxetine (PAXIL) 20 mg tablet paroxetine 20 mg tablet       No current facility-administered medications on file prior to visit. Allergies   Allergen Reactions    Adacel [Diphth,Pertus(Acell),Tetanus] Other (comments)     Arm swelling; redness    Pcn [Penicillins] Itching     ROS   ROS per HPI and PMH      Objective  Physical Exam  Skin:     General: Skin is warm and dry. Comments: 2 cm lesions noted with sharply demarcated boundaries and white bases x 3   Neurological:      Mental Status: She is oriented to person, place, and time. Psychiatric:         Mood and Affect: Mood normal.         Behavior: Behavior normal.         Thought Content: Thought content normal.         Judgment: Judgment normal.          Assessment & Plan      ICD-10-CM ICD-9-CM    1. Seborrheic dermatitis of scalp  L21.9 690.18      Diagnoses and all orders for this visit:    1. Seborrheic dermatitis of scalp  Will try a round of prednisone and patient is to call the office if this does not help and schedule an appointment    Other orders  -     predniSONE (DELTASONE) 20 mg tablet; Take 20 mg by mouth daily (with breakfast).         Antonieta Lobato NP

## 2021-06-08 ENCOUNTER — OFFICE VISIT (OUTPATIENT)
Dept: FAMILY MEDICINE CLINIC | Age: 76
End: 2021-06-08
Payer: MEDICARE

## 2021-06-08 VITALS
OXYGEN SATURATION: 97 % | RESPIRATION RATE: 16 BRPM | HEIGHT: 64 IN | WEIGHT: 187 LBS | BODY MASS INDEX: 31.92 KG/M2 | TEMPERATURE: 97.5 F | DIASTOLIC BLOOD PRESSURE: 60 MMHG | SYSTOLIC BLOOD PRESSURE: 142 MMHG | HEART RATE: 91 BPM

## 2021-06-08 DIAGNOSIS — L98.9 SCALP LESION: ICD-10-CM

## 2021-06-08 DIAGNOSIS — C92.91 MYELOID LEUKEMIA IN REMISSION, UNSPECIFIED MYELOID LEUKEMIA TYPE (HCC): ICD-10-CM

## 2021-06-08 DIAGNOSIS — I10 ESSENTIAL HYPERTENSION: Primary | ICD-10-CM

## 2021-06-08 DIAGNOSIS — F41.8 MIXED ANXIETY AND DEPRESSIVE DISORDER: ICD-10-CM

## 2021-06-08 DIAGNOSIS — Z11.59 ENCOUNTER FOR HEPATITIS C SCREENING TEST FOR LOW RISK PATIENT: ICD-10-CM

## 2021-06-08 DIAGNOSIS — N18.31 STAGE 3A CHRONIC KIDNEY DISEASE (HCC): ICD-10-CM

## 2021-06-08 DIAGNOSIS — E03.9 ACQUIRED HYPOTHYROIDISM: ICD-10-CM

## 2021-06-08 PROCEDURE — 1090F PRES/ABSN URINE INCON ASSESS: CPT | Performed by: NURSE PRACTITIONER

## 2021-06-08 PROCEDURE — 1101F PT FALLS ASSESS-DOCD LE1/YR: CPT | Performed by: NURSE PRACTITIONER

## 2021-06-08 PROCEDURE — G8417 CALC BMI ABV UP PARAM F/U: HCPCS | Performed by: NURSE PRACTITIONER

## 2021-06-08 PROCEDURE — 99214 OFFICE O/P EST MOD 30 MIN: CPT | Performed by: NURSE PRACTITIONER

## 2021-06-08 PROCEDURE — G8754 DIAS BP LESS 90: HCPCS | Performed by: NURSE PRACTITIONER

## 2021-06-08 PROCEDURE — G8753 SYS BP > OR = 140: HCPCS | Performed by: NURSE PRACTITIONER

## 2021-06-08 PROCEDURE — G8399 PT W/DXA RESULTS DOCUMENT: HCPCS | Performed by: NURSE PRACTITIONER

## 2021-06-08 PROCEDURE — G8536 NO DOC ELDER MAL SCRN: HCPCS | Performed by: NURSE PRACTITIONER

## 2021-06-08 PROCEDURE — G8427 DOCREV CUR MEDS BY ELIG CLIN: HCPCS | Performed by: NURSE PRACTITIONER

## 2021-06-08 PROCEDURE — G8432 DEP SCR NOT DOC, RNG: HCPCS | Performed by: NURSE PRACTITIONER

## 2021-06-08 RX ORDER — LORAZEPAM 0.5 MG/1
0.5 TABLET ORAL
Qty: 15 TABLET | Refills: 0 | Status: SHIPPED | OUTPATIENT
Start: 2021-06-08 | End: 2021-07-16

## 2021-06-08 RX ORDER — TRIAMCINOLONE ACETONIDE 1 MG/G
OINTMENT TOPICAL 2 TIMES DAILY
Qty: 30 G | Refills: 0 | Status: SHIPPED | OUTPATIENT
Start: 2021-06-08

## 2021-06-08 NOTE — PROGRESS NOTES
Subjective  Chief Complaint   Patient presents with    Follow-up     medication refills     HPI:  Amanda Herrmann is a 64 y.o. female. Patient presents for management of hypertension and anxiety. She also has an acute complaint today. Reports ongoing concerns about scalp lesion. Onset occurred after using T/Sal OTC therapeutic shampoo one time two weeks ago. Was seen by other provider who prescribed Prednisone 20mg daily with no improvement, she has completed 17 days of steroids. Reports pain at site of lesion. Reports one other site with hair loss. Has dermatology appointment 7/17/2021, requesting referral to new dermatologist to be seen sooner. Management of HTN-  Current meds: Amlodipine 5 mg daily  Home BP readings: 135/85  High salt intake: no  Stays hydrated: yes  Regular exercise: no  Denies lightheadedness, dizziness, headaches, chest pain, palpitations, lower extremity edema, and calf pain with exertion.     Past Medical History:   Diagnosis Date    Acquired hypothyroidism     Atrophic gastritis     Cobalamin deficiency     Essential hypertension     Fracture     left hip/ coccyx    Genital herpes simplex     Heart murmur     Herpes labialis     Hypertension     Irritable bowel syndrome     [facility]el[facility] ([facility] [address]     Pemphigoid     Pernicious anemia     Seborrheic dermatitis of scalp     Urinary incontinence      Family History   Problem Relation Age of Onset    Breast Cancer Sister     Hypertension Sister     Diabetes Mother     Cancer Father     Other Brother         leukemia     Social History     Socioeconomic History    Marital status:      Spouse name: Not on file    Number of children: Not on file    Years of education: Not on file    Highest education level: Not on file   Occupational History    Not on file   Tobacco Use    Smoking status: Never Smoker    Smokeless tobacco: Never Used   Vaping Use    Vaping Use: Never used   Substance and Sexual Activity    Alcohol use: Not Currently    Drug use: No    Sexual activity: Not Currently     Partners: Male   Other Topics Concern    Not on file   Social History Narrative    Not on file     Social Determinants of Health     Financial Resource Strain:     Difficulty of Paying Living Expenses:    Food Insecurity:     Worried About Running Out of Food in the Last Year:     [address] in the Last Year:    Transportation Needs:     Lack of Transportation (Medical):  Lack of Transportation (Non-Medical):    Physical Activity:     Days of Exercise per Week:     Minutes of Exercise per Session:    Stress:     Feeling of Stress :    Social Connections:     Frequency of Communication with Friends and Family:     Frequency of Social Gatherings with Friends and Family:     Attends Christian Services:     Active Member of Clubs or Organizations:     Attends Club or Organization Meetings:     Marital Status:    Intimate Partner Violence:     Fear of Current or Ex-Partner:     Emotionally Abused:     Physically Abused:     Sexually Abused:      Current Outpatient Medications on File Prior to Visit   Medication Sig Dispense Refill    amLODIPine (NORVASC) 5 mg tablet TAKE 1 TABLET BY MOUTH EVERY DAY 90 Tablet 0    predniSONE (DELTASONE) 20 mg tablet Take 20 mg by mouth daily (with breakfast). 10 Tablet 1    levothyroxine (SYNTHROID) 125 mcg tablet TAKE 1 TABLET BY MOUTH DAILY BEFORE BREAKFAST 90 Tab 1    dicyclomine (BENTYL) 10 mg capsule TAKE 1 CAPSULE BY MOUTH DAILY 90 Cap 3    doxazosin (CARDURA) 2 mg tablet TAKE 1 TABLET BY MOUTH EVERY NIGHT AT BEDTIME 90 Tab 1    oxybutynin (DITROPAN) 5 mg tablet TAKE 1 TABLET BY MOUTH TWICE DAILY FOR URINARY CONTINENCE 180 Tab 3    valACYclovir (VALTREX) 500 mg tablet TAKE 1 TABLET BY MOUTH TWICE DAILY FOR 3 DAYS.  MAX 6 DOSES PER OUTBREAK 18 Tab 0    cyanocobalamin (VITAMIN B12) 1,000 mcg/mL injection ADMINISTER 1 ML IN THE MUSCLE 1 TIME MONTHLY AS DIRECTED 3 mL 3    Insulin Syringe-Needle U-100 1 mL 31 gauge x 5/16 syrg insulin syringe U-100 with needle 1 mL 31 gauge x 5/16\"      dasatinib (SpryceL) 100 mg tab Sprycel 100 mg tablet      PARoxetine (PAXIL) 20 mg tablet paroxetine 20 mg tablet      [DISCONTINUED] LORazepam (ATIVAN) 0.5 mg tablet Take 1 Tab by mouth every eight (8) hours as needed for Anxiety. Max Daily Amount: 1.5 mg. 15 Tab 0     No current facility-administered medications on file prior to visit. Allergies   Allergen Reactions    Adacel [Diphth,Pertus(Acell),Tetanus] Other (comments)     Arm swelling; redness    Pcn [Penicillins] Itching     ROS  See HPI for pertinent ROS. Objective  Visit Vitals  BP (!) 142/60   Pulse 91   Temp 97.5 °F (36.4 °C) (Temporal)   Resp 16   Ht 5' 4\" (1.626 m)   Wt 187 lb (84.8 kg)   SpO2 97%   BMI 32.10 kg/m²       Physical Exam  Vitals and nursing note reviewed. Constitutional:       General: She is not in acute distress. Appearance: Normal appearance. HENT:      Head: Normocephalic. Eyes:      Extraocular Movements: Extraocular movements intact. Cardiovascular:      Rate and Rhythm: Normal rate and regular rhythm. Heart sounds: Murmur heard. Pulmonary:      Effort: Pulmonary effort is normal.      Breath sounds: Normal breath sounds. Musculoskeletal:         General: Normal range of motion. Right lower leg: No edema. Left lower leg: No edema. Skin:     General: Skin is warm and dry. Neurological:      Mental Status: She is alert and oriented to person, place, and time. Psychiatric:         Mood and Affect: Mood normal.         Behavior: Behavior normal.          Assessment & Plan      ICD-10-CM ICD-9-CM    1. Essential hypertension  J18 188.3 METABOLIC PANEL, BASIC   2. Mixed anxiety and depressive disorder  F41.8 300.4 LORazepam (ATIVAN) 0.5 mg tablet   3.  Scalp lesion  L98.9 709.9 triamcinolone acetonide (KENALOG) 0.1 % ointment      REFERRAL TO DERMATOLOGY   4. Stage 3a chronic kidney disease (HCC)  N18.31 585.3    5. Acquired hypothyroidism  E03.9 244.9 TSH 3RD GENERATION   6. Myeloid leukemia in remission, unspecified myeloid leukemia type ([facility]  C92.91 205.91    7. Encounter for hepatitis C screening test for low risk patient  Z11.59 V73.89 HCV AB W/RFLX TO BEULAH     Diagnoses and all orders for this visit:    1. Essential hypertension  BP above goal on intake and recheck. Reported home BP readings are below goal.  Continue current medication. Continue to check BP readings regularly and call if consistently greater than 140/90 on either number.  -     METABOLIC PANEL, BASIC    2. Mixed anxiety and depressive disorder  Remains well controlled with Paxil daily and lorazepam as needed. Reminded to take Paxil daily and do not abruptly discontinue. Declines trial off Paxil. Requesting refill of lorazepam.  -     LORazepam (ATIVAN) 0.5 mg tablet; Take 1 Tablet by mouth every eight (8) hours as needed for Anxiety. Max Daily Amount: 1.5 mg.    3. Scalp lesion  Seen in collaboration with Dr. Zana Schneider. Stop prednisone today. Start triamcinolone topical as ordered. Use triamcinolone sparingly. Referring to new dermatologist which will likely be able to see her sooner. -     triamcinolone acetonide (KENALOG) 0.1 % ointment; Apply  to affected area two (2) times a day. use thin layer  -     REFERRAL TO DERMATOLOGY    4. Stage 3a chronic kidney disease ([facility]  Kidney function has been stable in the past and we are rechecking this today. Reminded to maintain good BP control, stay well-hydrated, limit salt in diet, and use NSAIDs sparingly. 5. Acquired hypothyroidism  Thyroid function on the low side of normal when last checked following medication dose adjustment. Rechecking this today.  -     TSH 3RD GENERATION    6. Myeloid leukemia in remission, unspecified myeloid leukemia type ([facility]  Follows with oncology.     7. Encounter for hepatitis C screening test for low risk patient  -     HCV AB W/RFLX TO BEULAH      Follow-up and Dispositions    · Return in about 6 months (around 12/8/2021) for annual, follow up, chronic conditions/meds, fasting labs.            Zarina Rick, NP

## 2021-06-09 LAB
BUN SERPL-MCNC: 23 MG/DL (ref 8–27)
BUN/CREAT SERPL: 17 (ref 12–28)
CALCIUM SERPL-MCNC: 9.1 MG/DL (ref 8.7–10.3)
CHLORIDE SERPL-SCNC: 99 MMOL/L (ref 96–106)
CO2 SERPL-SCNC: 21 MMOL/L (ref 20–29)
CREAT SERPL-MCNC: 1.33 MG/DL (ref 0.57–1)
GLUCOSE SERPL-MCNC: 128 MG/DL (ref 65–99)
HCV AB S/CO SERPL IA: <0.1 S/CO RATIO (ref 0–0.9)
HCV AB SERPL QL IA: NORMAL
POTASSIUM SERPL-SCNC: 4.5 MMOL/L (ref 3.5–5.2)
SODIUM SERPL-SCNC: 136 MMOL/L (ref 134–144)
TSH SERPL DL<=0.005 MIU/L-ACNC: 0.29 UIU/ML (ref 0.45–4.5)

## 2021-06-10 DIAGNOSIS — E03.9 ACQUIRED HYPOTHYROIDISM: ICD-10-CM

## 2021-06-10 RX ORDER — DOXAZOSIN 2 MG/1
TABLET ORAL
Qty: 90 TABLET | Refills: 1 | Status: SHIPPED | OUTPATIENT
Start: 2021-06-10 | End: 2021-12-16

## 2021-06-10 RX ORDER — VALACYCLOVIR HYDROCHLORIDE 500 MG/1
TABLET, FILM COATED ORAL
Qty: 18 TABLET | Refills: 0 | Status: SHIPPED | OUTPATIENT
Start: 2021-06-10 | End: 2022-01-24 | Stop reason: SDUPTHER

## 2021-06-10 RX ORDER — LEVOTHYROXINE SODIUM 112 UG/1
112 TABLET ORAL
Qty: 60 TABLET | Refills: 0 | Status: SHIPPED | OUTPATIENT
Start: 2021-06-10 | End: 2021-08-05

## 2021-06-10 NOTE — PROGRESS NOTES
Your one time hepatitis C screening is negative. She is receiving too much thyroid hormone. Decrease dose to 112 mcg daily and recheck in 2 months. Creatinine, which is a marker of kidney function, remains elevated. In order to protect the kidneys be sure to drink water regularly, avoid salt in diet, keep blood pressure in normal range, and use NSAIDs such as ibuprofen and Naproxen sparingly.

## 2021-07-16 DIAGNOSIS — F41.8 MIXED ANXIETY AND DEPRESSIVE DISORDER: ICD-10-CM

## 2021-07-16 RX ORDER — LORAZEPAM 0.5 MG/1
TABLET ORAL
Qty: 15 TABLET | Refills: 0 | Status: SHIPPED | OUTPATIENT
Start: 2021-07-16

## 2021-08-04 DIAGNOSIS — E03.9 ACQUIRED HYPOTHYROIDISM: ICD-10-CM

## 2021-08-05 RX ORDER — LEVOTHYROXINE SODIUM 112 UG/1
TABLET ORAL
Qty: 60 TABLET | Refills: 0 | Status: SHIPPED | OUTPATIENT
Start: 2021-08-05 | End: 2021-10-01

## 2021-08-09 ENCOUNTER — LAB ONLY (OUTPATIENT)
Dept: FAMILY MEDICINE CLINIC | Age: 76
End: 2021-08-09

## 2021-08-09 DIAGNOSIS — E03.9 ACQUIRED HYPOTHYROIDISM: ICD-10-CM

## 2021-08-10 LAB — TSH SERPL DL<=0.005 MIU/L-ACNC: 1.28 UIU/ML (ref 0.45–4.5)

## 2021-09-03 RX ORDER — CYANOCOBALAMIN 1000 UG/ML
INJECTION, SOLUTION INTRAMUSCULAR; SUBCUTANEOUS
Qty: 3 ML | Refills: 3 | Status: SHIPPED | OUTPATIENT
Start: 2021-09-03 | End: 2022-07-27

## 2021-10-05 DIAGNOSIS — E03.9 ACQUIRED HYPOTHYROIDISM: Primary | ICD-10-CM

## 2021-10-06 LAB — TSH SERPL DL<=0.005 MIU/L-ACNC: 3.08 UIU/ML (ref 0.45–4.5)

## 2021-10-11 RX ORDER — AMLODIPINE BESYLATE 5 MG/1
TABLET ORAL
Qty: 90 TABLET | Refills: 0 | Status: SHIPPED | OUTPATIENT
Start: 2021-10-11 | End: 2021-12-08 | Stop reason: SDUPTHER

## 2021-12-08 ENCOUNTER — TELEPHONE (OUTPATIENT)
Dept: FAMILY MEDICINE CLINIC | Age: 76
End: 2021-12-08

## 2021-12-08 DIAGNOSIS — I10 ESSENTIAL HYPERTENSION: Primary | ICD-10-CM

## 2021-12-08 RX ORDER — AMLODIPINE BESYLATE 5 MG/1
5 TABLET ORAL DAILY
Qty: 90 TABLET | Refills: 0 | Status: SHIPPED | OUTPATIENT
Start: 2021-12-08 | End: 2022-03-15

## 2021-12-08 NOTE — TELEPHONE ENCOUNTER
----- Message from Neftali Marcial sent at 12/8/2021  9:52 AM EST -----  Subject: Message to Provider    QUESTIONS  Information for Provider? Pt magalie states that her pharmacy should be [facility]   in [address] [facility] now - not [place]  ---------------------------------------------------------------------------  --------------  CALL BACK INFO  What is the best way for the office to contact you? OK to leave message on   voicemail  Preferred Call Back Phone Number? [phone]  ---------------------------------------------------------------------------  --------------  SCRIPT ANSWERS  Relationship to Patient?  Self

## 2021-12-08 NOTE — TELEPHONE ENCOUNTER
Called pt to inform pharmacy has been changed and asked if she wanted us to change the meds that were called today over. Pt stated that she should be able to get them.

## 2021-12-16 RX ORDER — DOXAZOSIN 2 MG/1
TABLET ORAL
Qty: 90 TABLET | Refills: 1 | Status: SHIPPED | OUTPATIENT
Start: 2021-12-16 | End: 2022-06-07 | Stop reason: SDUPTHER

## 2022-01-11 RX ORDER — DICYCLOMINE HYDROCHLORIDE 10 MG/1
CAPSULE ORAL
Qty: 90 CAPSULE | Refills: 3 | Status: SHIPPED | OUTPATIENT
Start: 2022-01-11

## 2022-01-24 DIAGNOSIS — B00.1 HERPES LABIALIS: Primary | ICD-10-CM

## 2022-01-24 NOTE — TELEPHONE ENCOUNTER
Patient stated that her appointment is 02/09/2022 but she is completely out of Valacyclovir. She would like to know if can be filled today.

## 2022-01-25 RX ORDER — VALACYCLOVIR HYDROCHLORIDE 500 MG/1
TABLET, FILM COATED ORAL
Qty: 18 TABLET | Refills: 0 | Status: SHIPPED | OUTPATIENT
Start: 2022-01-25 | End: 2022-09-23

## 2022-02-09 ENCOUNTER — TELEPHONE (OUTPATIENT)
Dept: FAMILY MEDICINE CLINIC | Age: 77
End: 2022-02-09

## 2022-02-09 ENCOUNTER — OFFICE VISIT (OUTPATIENT)
Dept: FAMILY MEDICINE CLINIC | Age: 77
End: 2022-02-09
Payer: MEDICARE

## 2022-02-09 VITALS
SYSTOLIC BLOOD PRESSURE: 140 MMHG | WEIGHT: 185 LBS | DIASTOLIC BLOOD PRESSURE: 70 MMHG | BODY MASS INDEX: 31.58 KG/M2 | HEART RATE: 64 BPM | HEIGHT: 64 IN | OXYGEN SATURATION: 94 % | TEMPERATURE: 97.3 F

## 2022-02-09 DIAGNOSIS — E53.8 COBALAMIN DEFICIENCY: ICD-10-CM

## 2022-02-09 DIAGNOSIS — F41.1 GENERALIZED ANXIETY DISORDER: ICD-10-CM

## 2022-02-09 DIAGNOSIS — N18.31 STAGE 3A CHRONIC KIDNEY DISEASE (HCC): ICD-10-CM

## 2022-02-09 DIAGNOSIS — I10 ESSENTIAL HYPERTENSION: Primary | ICD-10-CM

## 2022-02-09 DIAGNOSIS — E66.09 CLASS 1 OBESITY DUE TO EXCESS CALORIES WITH SERIOUS COMORBIDITY AND BODY MASS INDEX (BMI) OF 31.0 TO 31.9 IN ADULT: ICD-10-CM

## 2022-02-09 DIAGNOSIS — E03.9 ACQUIRED HYPOTHYROIDISM: ICD-10-CM

## 2022-02-09 DIAGNOSIS — C92.10 CML (CHRONIC MYELOID LEUKEMIA) (HCC): ICD-10-CM

## 2022-02-09 PROCEDURE — G8754 DIAS BP LESS 90: HCPCS | Performed by: FAMILY MEDICINE

## 2022-02-09 PROCEDURE — 1090F PRES/ABSN URINE INCON ASSESS: CPT | Performed by: FAMILY MEDICINE

## 2022-02-09 PROCEDURE — G8753 SYS BP > OR = 140: HCPCS | Performed by: FAMILY MEDICINE

## 2022-02-09 PROCEDURE — G8536 NO DOC ELDER MAL SCRN: HCPCS | Performed by: FAMILY MEDICINE

## 2022-02-09 PROCEDURE — 99214 OFFICE O/P EST MOD 30 MIN: CPT | Performed by: FAMILY MEDICINE

## 2022-02-09 PROCEDURE — G8427 DOCREV CUR MEDS BY ELIG CLIN: HCPCS | Performed by: FAMILY MEDICINE

## 2022-02-09 PROCEDURE — G8417 CALC BMI ABV UP PARAM F/U: HCPCS | Performed by: FAMILY MEDICINE

## 2022-02-09 PROCEDURE — G8510 SCR DEP NEG, NO PLAN REQD: HCPCS | Performed by: FAMILY MEDICINE

## 2022-02-09 PROCEDURE — G8399 PT W/DXA RESULTS DOCUMENT: HCPCS | Performed by: FAMILY MEDICINE

## 2022-02-09 PROCEDURE — 1101F PT FALLS ASSESS-DOCD LE1/YR: CPT | Performed by: FAMILY MEDICINE

## 2022-02-09 RX ORDER — SERTRALINE HYDROCHLORIDE 50 MG/1
50 TABLET, FILM COATED ORAL DAILY
Qty: 30 TABLET | Refills: 1 | Status: SHIPPED | OUTPATIENT
Start: 2022-02-09 | End: 2022-03-08 | Stop reason: SDUPTHER

## 2022-02-09 NOTE — PROGRESS NOTES
Subjective  Chief Complaint   Patient presents with    Follow Up Chronic Condition     HPI:  Rosalie Clemons is a 64 y.o. female. She is following up today on hypertension and B12 deficiency histories. She reports taking blood pressure medication daily as directed and her B12 injections monthly. She is asking to restart something for her anxiety. She has been off the Paxil for a while now and cannot remember if that helped or not. She is not having any panic but does have some mild day-to-day anxiety. She is following closely with her oncologist for her CML history and brings in a recent CBC report for review. All is within normal limits.   Past Medical History:   Diagnosis Date    Acquired hypothyroidism     Atrophic gastritis     Cobalamin deficiency     Essential hypertension     Fracture     left hip/ coccyx    Genital herpes simplex     Heart murmur     Herpes labialis     Hypertension     Irritable bowel syndrome     [facility]eloid leukemia in remission ([facility] [address]     Pemphigoid     Pernicious anemia     Seborrheic dermatitis of scalp     Urinary incontinence      Family History   Problem Relation Age of Onset    Breast Cancer Sister     Hypertension Sister     Diabetes Mother     Cancer Father     Other Brother         leukemia     Social History     Socioeconomic History    Marital status:      Spouse name: Not on file    Number of children: Not on file    Years of education: Not on file    Highest education level: Not on file   Occupational History    Not on file   Tobacco Use    Smoking status: Never Smoker    Smokeless tobacco: Never Used   Vaping Use    Vaping Use: Never used   Substance and Sexual Activity    Alcohol use: Not Currently    Drug use: No    Sexual activity: Not Currently     Partners: Male   Other Topics Concern    Not on file   Social History Narrative    Not on file     Social Determinants of Health     Financial Resource Strain:     Difficulty of Paying Living Expenses: Not on file   Food Insecurity:     Worried About [address] [facility] in the Last Year: Not on file    Ran Out of Food in the Last Year: Not on file   Transportation Needs:     Lack of Transportation (Medical): Not on file    Lack of Transportation (Non-Medical): Not on file   Physical Activity:     Days of Exercise per Week: Not on file    Minutes of Exercise per Session: Not on file   Stress:     Feeling of Stress : Not on file   Social Connections:     Frequency of Communication with Friends and Family: Not on file    Frequency of Social Gatherings with Friends and Family: Not on file    Attends Pentecostal Services: Not on file    Active Member of [address] [place] [address] or Organizations: Not on file    Attends Club or Organization Meetings: Not on file    Marital Status: Not on file   Intimate Partner Violence:     Fear of Current or Ex-Partner: Not on file    Emotionally Abused: Not on file    Physically Abused: Not on file    Sexually Abused: Not on file   Housing Stability:     Unable to Pay for Housing in the Last Year: Not on file    Number of [place] in the Last Year: Not on file    Unstable Housing in the Last Year: Not on file     Current Outpatient Medications on File Prior to Visit   Medication Sig Dispense Refill    dicyclomine (BENTYL) 10 mg capsule TAKE 1 CAPSULE BY MOUTH EVERY DAY 90 Capsule 3    doxazosin (CARDURA) 2 mg tablet TAKE 1 TABLET BY MOUTH EVERY NIGHT AT BEDTIME 90 Tablet 1    amLODIPine (NORVASC) 5 mg tablet Take 1 Tablet by mouth daily.  90 Tablet 0    levothyroxine (SYNTHROID) 112 mcg tablet TAKE 1 TABLET BY MOUTH DAILY BEFORE BREAKFAST 90 Tablet 2    cyanocobalamin (VITAMIN B12) 1,000 mcg/mL injection ADMINISTER 1 ML IN THE MUSCLE 1 TIME MONTHLY AS DIRECTED 3 mL 3    oxybutynin (DITROPAN) 5 mg tablet TAKE 1 TABLET BY MOUTH TWICE DAILY FOR URINARY CONTINENCE 180 Tab 3    dasatinib (SpryceL) 100 mg tab Sprycel 100 mg tablet      valACYclovir (VALTREX) 500 mg tablet TAKE 1 TABLET BY MOUTH TWICE DAILY FOR 3 DAYS. MAX 6 DOSES PER OUTBREAK (Patient not taking: Reported on 2/9/2022) 18 Tablet 0    LORazepam (ATIVAN) 0.5 mg tablet TAKE 1 TABLET BY MOUTH EVERY 8 HOURS AS NEEDED FOR ANXIETY. MAX DAILY AMOUNT: 1.5 MG (Patient not taking: Reported on 2/9/2022) 15 Tablet 0    triamcinolone acetonide (KENALOG) 0.1 % ointment Apply  to affected area two (2) times a day. use thin layer (Patient not taking: Reported on 2/9/2022) 30 g 0    [DISCONTINUED] predniSONE (DELTASONE) 20 mg tablet Take 20 mg by mouth daily (with breakfast). (Patient not taking: Reported on 2/9/2022) 10 Tablet 1    Insulin Syringe-Needle U-100 1 mL 31 gauge x 5/16 syrg insulin syringe U-100 with needle 1 mL 31 gauge x 5/16\"      [DISCONTINUED] PARoxetine (PAXIL) 20 mg tablet paroxetine 20 mg tablet (Patient not taking: Reported on 2/9/2022)       No current facility-administered medications on file prior to visit. Allergies   Allergen Reactions    Adacel [Diphth,Pertus(Acell),Tetanus] Other (comments)     Arm swelling; redness    Pcn [Penicillins] Itching     Review of Systems   Constitutional: Negative for chills, fever and malaise/fatigue. Respiratory: Negative for cough, shortness of breath and wheezing. Cardiovascular: Negative for chest pain, palpitations and leg swelling. Gastrointestinal: Negative for diarrhea and vomiting. Genitourinary: Negative for dysuria. Neurological: Negative for dizziness, tingling and weakness. Psychiatric/Behavioral: Negative for depression. The patient is not nervous/anxious. Objective  Visit Vitals  BP (!) 140/70 (BP 1 Location: Left upper arm, BP Patient Position: At rest, BP Cuff Size: Adult)   Pulse 64   Temp 97.3 °F (36.3 °C) (Temporal)   Ht 5' 4\" (1.626 m)   Wt 185 lb (83.9 kg)   SpO2 94%   BMI 31.76 kg/m²     Physical Exam  Constitutional:       General: She is not in acute distress. Appearance: Normal appearance.  She is obese. HENT:      Head: Normocephalic and atraumatic. Neck:      Thyroid: No thyroid mass or thyromegaly. Cardiovascular:      Rate and Rhythm: Normal rate and regular rhythm. Heart sounds: No murmur heard. Pulmonary:      Effort: Pulmonary effort is normal. No respiratory distress. Breath sounds: Normal breath sounds. No wheezing. Musculoskeletal:      Cervical back: Neck supple. No muscular tenderness. Right lower leg: No edema. Left lower leg: No edema. Lymphadenopathy:      Cervical: No cervical adenopathy. Skin:     General: Skin is warm and dry. Neurological:      Mental Status: She is alert and oriented to person, place, and time. Mental status is at baseline. Psychiatric:         Attention and Perception: Attention and perception normal.         Mood and Affect: Mood and affect normal.         Speech: Speech normal.         Behavior: Behavior normal.          Assessment & Plan    ICD-10-CM ICD-9-CM    1. Essential hypertension  I10 401.9 LIPID PANEL      METABOLIC PANEL, COMPREHENSIVE   2. Stage 3a chronic kidney disease (HCC)  N18.31 585.3    3. Cobalamin deficiency  E53.8 266.2 VITAMIN B12   4. Class 1 obesity due to excess calories with serious comorbidity and body mass index (BMI) of 31.0 to 31.9 in adult  E66.09 278.00     Z68.31 V85.31    5. Generalized anxiety disorder  F41.1 300.02 sertraline (ZOLOFT) 50 mg tablet      THYROID CASCADE PROFILE   6. Acquired hypothyroidism  E03.9 244.9 THYROID CASCADE PROFILE   7. CML (chronic myeloid leukemia) ([facility])  C92.10 205.10      Diagnoses and all orders for this visit:    1. Essential hypertension  -     LIPID PANEL  -     METABOLIC PANEL, COMPREHENSIVE  Checking listed levels and will make adjustments if necessary. For now continue current dose of amlodipine. 2. Stage 3a chronic kidney disease (HCC)  Recommend low salt diet, regular water intake, and minimal use of NSAIDs, ex: Aleve, ibuprofen.     3. Cobalamin deficiency  -     VITAMIN B12  Patient reports doing injections monthly at home. Checking level and will make adjustments if necessary. 4. Class 1 obesity due to excess calories with serious comorbidity and body mass index (BMI) of 31.0 to 31.9 in adult    5. Generalized anxiety disorder  -     sertraline (ZOLOFT) 50 mg tablet; Take 1 Tablet by mouth daily.  -     THYROID CASCADE PROFILE  Patient does report knowing that she did well on sertraline in the past.  We will restart this 1 and check the thyroid again to make sure it is goal.  Patient understands how to take medication what to expect. 6. Acquired hypothyroidism  -     THYROID CASCADE PROFILE  Patient reports taking medication daily as directed. We are checking annual level with labs. 7. CML (chronic myeloid leukemia) [facility])  Assessment & Plan:   Zuhair Lombardi is managing. Doing well on Sprycel. Follow-up and Dispositions    · Return in about 6 months (around 8/9/2022) for wellness, NON fasting f/u.        Omar Hurst MD

## 2022-02-09 NOTE — TELEPHONE ENCOUNTER
Patients daughter called ref her mother - states mother is very depressed and will not talk about it when she comes in. I explained to the daughter that we could not discuss her mothers health because she isn't on a HIPPA form from the patient.

## 2022-02-09 NOTE — TELEPHONE ENCOUNTER
Tim stated that she would like to speak with the nurse before the patient's appointment today 02/09/2022 at 1 pm.

## 2022-02-09 NOTE — PROGRESS NOTES
Chief Complaint   Patient presents with    Follow Up Chronic Condition     1. Have you been to the ER, urgent care clinic since your last visit? Hospitalized since your last visit? No    2. Have you seen or consulted any other health care providers outside of the [address] [place] [address] since your last visit? Include any pap smears or colon screening.  Yes, patient has seen Oncologist       3 most recent PHQ Screens 2/9/2022   Little interest or pleasure in doing things Several days   Feeling down, depressed, irritable, or hopeless Several days   Total Score PHQ 2 2

## 2022-02-11 LAB
ALBUMIN SERPL-MCNC: 4.3 G/DL (ref 3.7–4.7)
ALBUMIN/GLOB SERPL: 1.7 {RATIO} (ref 1.2–2.2)
ALP SERPL-CCNC: 74 IU/L (ref 44–121)
ALT SERPL-CCNC: 14 IU/L (ref 0–32)
AST SERPL-CCNC: 23 IU/L (ref 0–40)
BILIRUB SERPL-MCNC: 0.5 MG/DL (ref 0–1.2)
BUN SERPL-MCNC: 15 MG/DL (ref 8–27)
BUN/CREAT SERPL: 15 (ref 12–28)
CALCIUM SERPL-MCNC: 8.9 MG/DL (ref 8.7–10.3)
CHLORIDE SERPL-SCNC: 106 MMOL/L (ref 96–106)
CHOLEST SERPL-MCNC: 187 MG/DL (ref 100–199)
CO2 SERPL-SCNC: 23 MMOL/L (ref 20–29)
CREAT SERPL-MCNC: 0.97 MG/DL (ref 0.57–1)
GLOBULIN SER CALC-MCNC: 2.6 G/DL (ref 1.5–4.5)
GLUCOSE SERPL-MCNC: 98 MG/DL (ref 65–99)
HDLC SERPL-MCNC: 103 MG/DL
LDLC SERPL CALC-MCNC: 75 MG/DL (ref 0–99)
POTASSIUM SERPL-SCNC: 4.2 MMOL/L (ref 3.5–5.2)
PROT SERPL-MCNC: 6.9 G/DL (ref 6–8.5)
SODIUM SERPL-SCNC: 140 MMOL/L (ref 134–144)
TRIGL SERPL-MCNC: 41 MG/DL (ref 0–149)
TSH SERPL DL<=0.005 MIU/L-ACNC: 2.84 UIU/ML (ref 0.45–4.5)
VIT B12 SERPL-MCNC: 724 PG/ML (ref 232–1245)
VLDLC SERPL CALC-MCNC: 9 MG/DL (ref 5–40)

## 2022-02-24 ENCOUNTER — TELEPHONE (OUTPATIENT)
Dept: FAMILY MEDICINE CLINIC | Age: 77
End: 2022-02-24

## 2022-02-24 NOTE — TELEPHONE ENCOUNTER
Daughter stated that the patient is already taking Paxil and was prescribed Zoloft but has stopped taking it because it is \"making her loopy. \"    She stated that she was wondering why 2 of those types of medication were prescribed and wondered if a prn could be prescribed. She would like a call back to discuss meds.

## 2022-03-01 NOTE — TELEPHONE ENCOUNTER
Patient advised. Patient states she isnt going to take the Sertraline. Patient was offered an appointment to come in and discuss. Patient will call back to schedule an appointment.

## 2022-03-01 NOTE — TELEPHONE ENCOUNTER
Patient states that she was given sertraline from [facility]. Patient had to see ksl because krp was unavailable. Patient states that the medication is not working. She does not want it. Patient requested diazepam and was given sertraline. Please advise. She states she needs something else for anxiety.

## 2022-03-01 NOTE — TELEPHONE ENCOUNTER
Sertraline can take 6-8 weeks to reach therapeutic dosing. I would give it a little more time. As for Diazepam, this is not safe for daily use and I don't see she has ever taken it before. She would need a visit to discuss.

## 2022-03-07 ENCOUNTER — TELEPHONE (OUTPATIENT)
Dept: FAMILY MEDICINE CLINIC | Age: 77
End: 2022-03-07

## 2022-03-07 DIAGNOSIS — F41.1 GENERALIZED ANXIETY DISORDER: ICD-10-CM

## 2022-03-08 RX ORDER — SERTRALINE HYDROCHLORIDE 50 MG/1
50 TABLET, FILM COATED ORAL DAILY
Qty: 90 TABLET | Refills: 1 | Status: SHIPPED | OUTPATIENT
Start: 2022-03-08 | End: 2022-09-26

## 2022-03-18 PROBLEM — K29.40 ATROPHIC GASTRITIS: Status: ACTIVE | Noted: 2020-08-12

## 2022-03-18 PROBLEM — E53.8 COBALAMIN DEFICIENCY: Status: ACTIVE | Noted: 2020-08-12

## 2022-03-18 PROBLEM — E03.9 ACQUIRED HYPOTHYROIDISM: Status: ACTIVE | Noted: 2020-08-12

## 2022-03-18 PROBLEM — B00.1 HERPES LABIALIS: Status: ACTIVE | Noted: 2020-08-12

## 2022-03-19 PROBLEM — E66.09 CLASS 1 OBESITY DUE TO EXCESS CALORIES WITH SERIOUS COMORBIDITY AND BODY MASS INDEX (BMI) OF 31.0 TO 31.9 IN ADULT: Status: ACTIVE | Noted: 2020-08-12

## 2022-03-19 PROBLEM — L21.9 SEBORRHEIC DERMATITIS OF SCALP: Status: ACTIVE | Noted: 2020-08-12

## 2022-03-19 PROBLEM — C92.10 CML (CHRONIC MYELOID LEUKEMIA) (HCC): Status: ACTIVE | Noted: 2020-08-12

## 2022-03-19 PROBLEM — I10 ESSENTIAL HYPERTENSION: Status: ACTIVE | Noted: 2020-08-12

## 2022-03-19 PROBLEM — A60.00 GENITAL HERPES SIMPLEX: Status: ACTIVE | Noted: 2020-08-12

## 2022-03-19 PROBLEM — R01.1 HEART MURMUR: Status: ACTIVE | Noted: 2020-08-12

## 2022-03-19 PROBLEM — K58.9 IRRITABLE BOWEL SYNDROME: Status: ACTIVE | Noted: 2020-08-12

## 2022-03-19 PROBLEM — D51.0 PERNICIOUS ANEMIA: Status: ACTIVE | Noted: 2020-08-12

## 2022-03-19 PROBLEM — B37.2 CANDIDIASIS OF SKIN: Status: ACTIVE | Noted: 2020-08-12

## 2022-03-19 PROBLEM — E66.811 CLASS 1 OBESITY DUE TO EXCESS CALORIES WITH SERIOUS COMORBIDITY AND BODY MASS INDEX (BMI) OF 31.0 TO 31.9 IN ADULT: Status: ACTIVE | Noted: 2020-08-12

## 2022-03-20 PROBLEM — R32 URINARY INCONTINENCE: Status: ACTIVE | Noted: 2020-08-12

## 2022-03-20 PROBLEM — N18.30 CHRONIC KIDNEY DISEASE, STAGE 3 (HCC): Status: ACTIVE | Noted: 2020-08-12

## 2022-06-07 NOTE — TELEPHONE ENCOUNTER
She wasn't able to get appointment with Maren Smith until 7/19/2022 She's going to run out of her doxazosin before then She only has nine pills left.  Would like at least enough to carry her til then

## 2022-06-08 RX ORDER — DOXAZOSIN 2 MG/1
2 TABLET ORAL
Qty: 90 TABLET | Refills: 0 | Status: SHIPPED | OUTPATIENT
Start: 2022-06-08 | End: 2022-09-02

## 2022-07-27 RX ORDER — CYANOCOBALAMIN 1000 UG/ML
INJECTION, SOLUTION INTRAMUSCULAR; SUBCUTANEOUS
Qty: 3 ML | Refills: 1 | Status: SHIPPED | OUTPATIENT
Start: 2022-07-27

## 2022-09-23 DIAGNOSIS — B00.1 HERPES LABIALIS: ICD-10-CM

## 2022-09-23 RX ORDER — VALACYCLOVIR HYDROCHLORIDE 500 MG/1
TABLET, FILM COATED ORAL
Qty: 18 TABLET | Refills: 0 | Status: SHIPPED | OUTPATIENT
Start: 2022-09-23

## 2022-09-24 DIAGNOSIS — F41.1 GENERALIZED ANXIETY DISORDER: ICD-10-CM

## 2022-09-26 RX ORDER — SERTRALINE HYDROCHLORIDE 50 MG/1
TABLET, FILM COATED ORAL
Qty: 90 TABLET | Refills: 1 | Status: SHIPPED | OUTPATIENT
Start: 2022-09-26 | End: 2022-10-27 | Stop reason: DRUGHIGH

## 2022-10-10 DIAGNOSIS — I10 ESSENTIAL HYPERTENSION: ICD-10-CM

## 2022-10-10 RX ORDER — AMLODIPINE BESYLATE 5 MG/1
5 TABLET ORAL DAILY
Qty: 90 TABLET | Refills: 1 | Status: SHIPPED | OUTPATIENT
Start: 2022-10-10 | End: 2022-10-27 | Stop reason: DRUGHIGH

## 2022-10-27 ENCOUNTER — VIRTUAL VISIT (OUTPATIENT)
Dept: FAMILY MEDICINE CLINIC | Age: 77
End: 2022-10-27
Payer: MEDICARE

## 2022-10-27 DIAGNOSIS — E66.09 CLASS 1 OBESITY DUE TO EXCESS CALORIES WITH SERIOUS COMORBIDITY AND BODY MASS INDEX (BMI) OF 31.0 TO 31.9 IN ADULT: ICD-10-CM

## 2022-10-27 DIAGNOSIS — F41.1 GENERALIZED ANXIETY DISORDER: ICD-10-CM

## 2022-10-27 DIAGNOSIS — I10 ESSENTIAL HYPERTENSION: Primary | ICD-10-CM

## 2022-10-27 PROCEDURE — 1123F ACP DISCUSS/DSCN MKR DOCD: CPT | Performed by: FAMILY MEDICINE

## 2022-10-27 PROCEDURE — G8510 SCR DEP NEG, NO PLAN REQD: HCPCS | Performed by: FAMILY MEDICINE

## 2022-10-27 PROCEDURE — G8399 PT W/DXA RESULTS DOCUMENT: HCPCS | Performed by: FAMILY MEDICINE

## 2022-10-27 PROCEDURE — 99214 OFFICE O/P EST MOD 30 MIN: CPT | Performed by: FAMILY MEDICINE

## 2022-10-27 PROCEDURE — 1090F PRES/ABSN URINE INCON ASSESS: CPT | Performed by: FAMILY MEDICINE

## 2022-10-27 PROCEDURE — G8427 DOCREV CUR MEDS BY ELIG CLIN: HCPCS | Performed by: FAMILY MEDICINE

## 2022-10-27 PROCEDURE — 1101F PT FALLS ASSESS-DOCD LE1/YR: CPT | Performed by: FAMILY MEDICINE

## 2022-10-27 PROCEDURE — G8756 NO BP MEASURE DOC: HCPCS | Performed by: FAMILY MEDICINE

## 2022-10-27 RX ORDER — AMLODIPINE BESYLATE 5 MG/1
5 TABLET ORAL DAILY
Qty: 90 TABLET | Refills: 1 | Status: SHIPPED | OUTPATIENT
Start: 2022-10-27

## 2022-10-27 RX ORDER — SERTRALINE HYDROCHLORIDE 100 MG/1
100 TABLET, FILM COATED ORAL DAILY
Qty: 90 TABLET | Refills: 2 | Status: SHIPPED | OUTPATIENT
Start: 2022-10-27

## 2022-10-27 RX ORDER — PAROXETINE HYDROCHLORIDE 20 MG/1
20 TABLET, FILM COATED ORAL DAILY
COMMUNITY
End: 2022-10-27 | Stop reason: ALTCHOICE

## 2022-10-27 NOTE — PROGRESS NOTES
Consent:  Ralph Curiel, was evaluated through a synchronous (real-time) audio-video encounter. The patient (or guardian if applicable) is aware that this is a billable service, which includes applicable co-pays. This Virtual Visit was conducted with patient's (and/or legal guardian's) consent. The visit was conducted pursuant to the emergency declaration under the [address] [place] [address] and the [employer] General Act. Patient identification was verified, and a caregiver was present when appropriate. The patient was located in a state where the provider was licensed to provide care. 712  Subjective:   Ralph Curiel is a 76 y.o. female who was seen for Follow Up Chronic Condition    She is scheduled today to follow-up with hypertension. She reports taking her medication consistently. When she saw Dr. Soledad Kruger last her blood pressure on intake was 161/90. She states that when they rechecked it it was back down to normal.  She initially tells me that most of her readings at home are around 140/84 but later in the appointment it seems that she may not be checking them consistently at home. She also states that she thinks her anxiety is not well enough controlled and that increases her blood pressure. She has somehow inadvertently ended up right now on Paxil 20 mg and sertraline 50 mg. She reports being relatively inactive due to knee pain. She tells me that she does see an orthopedic specialist and was told that she would need a knee replacement but does not have her next appointment with them scheduled. Prior to Admission medications    Medication Sig Start Date End Date Taking? Authorizing Provider   amLODIPine (NORVASC) 5 mg tablet Take 1 Tablet by mouth daily. 10/27/22  Yes Royal Majano MD   sertraline (ZOLOFT) 100 mg tablet Take 1 Tablet by mouth daily.  10/27/22  Yes Royal Majano MD valACYclovir (VALTREX) 500 mg tablet TAKE 1 TABLET BY MOUTH TWICE DAILY FOR 3 DAYS. MAX 6 DOSES PER OUTBREAK 9/23/22  Yes Baron Yanes NP   doxazosin (CARDURA) 2 mg tablet TAKE 1 TABLET BY MOUTH EVERY NIGHT 9/2/22  Yes Karli Greer MD   cyanocobalamin (VITAMIN B12) 1,000 mcg/mL injection ADMINISTER 1 ML INTO MUSCLE ONCE MONTHLY AS DIRECTED 7/27/22  Yes Baron Yanes NP   levothyroxine (SYNTHROID) 112 mcg tablet TAKE 1 TABLET DAILY BEFORE BREAKFAST 7/1/22  Yes Baron Yanes NP   dicyclomine (BENTYL) 10 mg capsule TAKE 1 CAPSULE BY MOUTH EVERY DAY 1/11/22  Yes Baron Yanes NP   oxybutynin (DITROPAN) 5 mg tablet TAKE 1 TABLET BY MOUTH TWICE DAILY FOR URINARY CONTINENCE 10/15/20  Yes Baron Yanes NP   Insulin Syringe-Needle U-100 1 mL 31 gauge x 5/16 syrg insulin syringe U-100 with needle 1 mL 31 gauge x 5/16\"   Yes Provider, Historical   dasatinib (SpryceL) 100 mg tab Sprycel 100 mg tablet 10/22/18  Yes Provider, Historical   PARoxetine (PAXIL) 20 mg tablet Take 20 mg by mouth daily. 10/27/22  Provider, Historical   amLODIPine (NORVASC) 5 mg tablet TAKE 1 TABLET BY MOUTH DAILY 10/10/22 10/27/22  Baron Yanes NP   sertraline (ZOLOFT) 50 mg tablet TAKE 1 TABLET BY MOUTH EVERY DAY 9/26/22 10/27/22  Baron Yanes NP   LORazepam (ATIVAN) 0.5 mg tablet TAKE 1 TABLET BY MOUTH EVERY 8 HOURS AS NEEDED FOR ANXIETY. MAX DAILY AMOUNT: 1.5 MG  Patient not taking: No sig reported 7/16/21   Baron Yanes NP   triamcinolone acetonide (KENALOG) 0.1 % ointment Apply  to affected area two (2) times a day.  use thin layer  Patient not taking: No sig reported 6/8/21   Baron Yanes NP     Allergies   Allergen Reactions    Adacel [Diphth,Pertus(Acell),Tetanus] Other (comments)     Arm swelling; redness    Pcn [Penicillins] Itching     Patient Active Problem List    Diagnosis    Generalized anxiety disorder    Atrophic gastritis    Candidiasis of skin    Chronic kidney disease, stage 3 ([facility]    Cobalamin deficiency    Essential hypertension    Genital herpes simplex    Heart murmur    Herpes labialis    Acquired hypothyroidism    Irritable bowel syndrome    CML (chronic myeloid leukemia) (HCC)     Dorathy Dense is managing. Doing well on Sprycel. Class 1 obesity due to excess calories with serious comorbidity and body mass index (BMI) of 31.0 to 31.9 in adult    Pernicious anemia    Seborrheic dermatitis of scalp    Urinary incontinence       Objective:   Vital Signs: (As obtained by patient/caregiver at home)  There were no vitals taken for this visit. [INSTRUCTIONS:  \"[x]\" Indicates a positive item  \"[]\" Indicates a negative item  -- DELETE ALL ITEMS NOT EXAMINED]    Constitutional: [x] Appears well-developed and well-nourished [x] No apparent distress      [] Abnormal -     Mental status: [x] Alert and awake  [x] Oriented to person/place/time [x] Able to follow commands    [] Abnormal -     Eyes:   EOM    [x]  Normal    [] Abnormal -   Sclera  [x]  Normal    [] Abnormal -          Discharge [x]  None visible   [] Abnormal -     HENT: [x] Normocephalic, atraumatic  [] Abnormal -   [] Mouth/Throat: Mucous membranes are moist    External Ears [] Normal  [] Abnormal -    Neck: [x] No visualized mass [] Abnormal -     Pulmonary/Chest: [x] Respiratory effort normal   [x] No visualized signs of difficulty breathing or respiratory distress        [] Abnormal -        Neurological:        [x] No Facial Asymmetry (Cranial nerve 7 motor function) (limited exam due to video visit)          [x] No gaze palsy        [] Abnormal -          Skin:        [x] No significant exanthematous lesions or discoloration noted on facial skin         [] Abnormal -            Psychiatric:       [x] Normal Affect [] Abnormal -        [x] No Hallucinations    Other pertinent observable physical exam findings:-              Assessment & Plan:   Diagnoses and all orders for this visit:    1.  Essential hypertension  -     amLODIPine (NORVASC) 5 mg tablet; Take 1 Tablet by mouth daily. We are going to keep her blood pressure medication at current dose for right now and see if it improves with hopefully getting the anxiety under better control. I also strongly encouraged her to work on increasing activity. If her knee pain is limiting this then I am advising her to schedule an appointment with the orthopedic specialist again. 2. Class 1 obesity due to excess calories with serious comorbidity and body mass index (BMI) of 31.0 to 31.9 in adult    3. Generalized anxiety disorder  -     sertraline (ZOLOFT) 100 mg tablet; Take 1 Tablet by mouth daily. I reviewed with her that she cannot be on both the Paxil and the sertraline. We are going to stop her Paxil as of tomorrow. Starting tomorrow she can take 100 mg instead of 50 on the sertraline. If she is not seeing improvement in the anxiety by around Thanksgiving time, she should notify the office and we can set up another virtual visit to discuss. Otherwise I will see her back around February when she is due for annual exam with labs. Follow-up and Dispositions    Return in about 4 months (around 2/17/2023) for [address], f/u- ideally fast.           We discussed the expected course, resolution and complications of the diagnosis(es) in detail. Medication risks, benefits, costs, interactions, and alternatives were discussed as indicated. I advised her to contact the office if her condition worsens, changes or fails to improve as anticipated. She expressed understanding with the diagnosis(es) and plan. Cricket Gagnon is a 76 y.o. female being evaluated by a video visit encounter for concerns as above. A caregiver was present when appropriate.  Due to this being a TeleHealth encounter (During IOJFP-07 public health emergency), evaluation of the following organ systems was limited: Vitals/Constitutional/EENT/Resp/CV/GI//MS/Neuro/Skin/Heme-Lymph-Imm. Pursuant to the emergency declaration under the [address] [place] [address], [facility] waiver authority and the [employer] Act, this Virtual  Visit was conducted, with patient's (and/or legal guardian's) consent, to reduce the patient's risk of exposure to COVID-19 and provide necessary medical care. Services were provided through a video synchronous discussion virtually to substitute for in-person clinic visit. Patient and provider were located at their individual homes. Aspects of this note have been generated using voice recognition software. Despite editing, there may be some syntax errors.     Mary Ann Oro MD

## 2022-10-27 NOTE — PROGRESS NOTES
Chief Complaint   Patient presents with    Follow Up Chronic Condition   1. Have you been to the ER, urgent care clinic since your last visit? Hospitalized since your last visit? No    2. Have you seen or consulted any other health care providers outside of the [address] [place] [address] since your last visit? Include any pap smears or colon screening.  Yes, patient has seen Oncology       3 most recent PHQ Screens 10/27/2022   Little interest or pleasure in doing things Several days   Feeling down, depressed, irritable, or hopeless Several days   Total Score PHQ 2 2         Patient would like to use  # [phone] for her visit today

## 2022-11-29 RX ORDER — DOXAZOSIN 2 MG/1
TABLET ORAL
Qty: 90 TABLET | Refills: 0 | Status: SHIPPED | OUTPATIENT
Start: 2022-11-29

## 2023-02-27 RX ORDER — DOXAZOSIN 2 MG/1
TABLET ORAL
Qty: 90 TABLET | Refills: 0 | Status: SHIPPED | OUTPATIENT
Start: 2023-02-27

## 2023-03-09 ENCOUNTER — TELEPHONE (OUTPATIENT)
Dept: FAMILY MEDICINE CLINIC | Age: 78
End: 2023-03-09

## 2023-03-09 RX ORDER — DOXAZOSIN 2 MG/1
2 TABLET ORAL
Qty: 90 TABLET | Refills: 1 | Status: SHIPPED | OUTPATIENT
Start: 2023-03-09

## 2023-03-09 NOTE — TELEPHONE ENCOUNTER
----- Message from Noemi Orozco sent at 3/9/2023  9:56 AM EST -----  Subject: Refill Request    QUESTIONS  Name of Medication? doxazosin (CARDURA) 2 mg tablet  Patient-reported dosage and instructions? TAKE 1 TABLET BY MOUTH EVERY   NIGHT  How many days do you have left? 0  [id number]  Pharmacy phone number (if available)? [phone]  Additional Information for Provider? Patient has VV on 3/16/2023  ---------------------------------------------------------------------------  --------------  CALL BACK INFO  What is the best way for the office to contact you? OK to leave message on   [facility]  Preferred Call Back Phone Number? [phone]  ---------------------------------------------------------------------------  --------------  SCRIPT ANSWERS  Relationship to Patient?  Self

## 2023-03-09 NOTE — TELEPHONE ENCOUNTER
----- Message from Waleska Franco sent at 3/9/2023  9:56 AM EST -----  Subject: Refill Request    QUESTIONS  Name of Medication? doxazosin (CARDURA) 2 mg tablet  Patient-reported dosage and instructions? TAKE 1 TABLET BY MOUTH EVERY   NIGHT  How many days do you have left? 0  [facility]? [id number]  Pharmacy phone number (if available)? [phone]  Additional Information for Provider? Patient has VV on 3/16/2023  ---------------------------------------------------------------------------  --------------  CALL BACK INFO  What is the best way for the office to contact you? OK to leave message on   voicemail  Preferred Call Back Phone Number? [phone]  ---------------------------------------------------------------------------  --------------  SCRIPT ANSWERS  Relationship to Patient?  Self

## 2023-03-29 ENCOUNTER — TELEPHONE (OUTPATIENT)
Dept: FAMILY MEDICINE CLINIC | Age: 78
End: 2023-03-29

## 2023-03-29 DIAGNOSIS — F41.8 MIXED ANXIETY AND DEPRESSIVE DISORDER: ICD-10-CM

## 2023-03-29 DIAGNOSIS — B00.1 HERPES LABIALIS: ICD-10-CM

## 2023-03-29 RX ORDER — VALACYCLOVIR HYDROCHLORIDE 500 MG/1
TABLET, FILM COATED ORAL
Qty: 18 TABLET | Refills: 0 | Status: SHIPPED | OUTPATIENT
Start: 2023-03-29

## 2023-03-29 RX ORDER — LORAZEPAM 0.5 MG/1
TABLET ORAL
Qty: 15 TABLET | Refills: 0 | Status: SHIPPED | OUTPATIENT
Start: 2023-03-29

## 2023-03-29 NOTE — TELEPHONE ENCOUNTER
Patient called in regards to the no show virtual visit on the 03/16/2023. Patient requesting refill about prescriptions Valacyclovir 500mg and Lorazepam 0.5mg to the [facility] [place] [address].  Patient has rescheduled virtual appointment for 04/10/2023

## 2023-04-20 ENCOUNTER — TELEPHONE (OUTPATIENT)
Dept: FAMILY MEDICINE CLINIC | Age: 78
End: 2023-04-20

## 2023-04-20 DIAGNOSIS — E53.8 COBALAMIN DEFICIENCY: ICD-10-CM

## 2023-04-20 NOTE — TELEPHONE ENCOUNTER
Was told she could go back on her B12 injections but the pharmacy has not gotten a script to refill for patient.

## 2023-04-21 ENCOUNTER — TELEPHONE (OUTPATIENT)
Dept: FAMILY MEDICINE CLINIC | Age: 78
End: 2023-04-21

## 2023-04-21 DIAGNOSIS — E53.8 COBALAMIN DEFICIENCY: ICD-10-CM

## 2023-04-21 RX ORDER — CYANOCOBALAMIN 1000 UG/ML
INJECTION, SOLUTION INTRAMUSCULAR; SUBCUTANEOUS
Qty: 3 ML | Refills: 1
Start: 2023-04-21 | End: 2023-04-21 | Stop reason: SDUPTHER

## 2023-04-21 RX ORDER — CYANOCOBALAMIN 1000 UG/ML
INJECTION, SOLUTION INTRAMUSCULAR; SUBCUTANEOUS
Qty: 3 ML | Refills: 1 | Status: SHIPPED | OUTPATIENT
Start: 2023-04-21

## 2023-04-21 NOTE — TELEPHONE ENCOUNTER
Just sent it for the third time to [facility].

## 2023-04-21 NOTE — TELEPHONE ENCOUNTER
Patient called again. States b12 isnt at pharmacy. Can you please check prescription. I cant see pharmacy on the prescription. Patient states its suppose to go to [facility].

## 2023-05-01 ENCOUNTER — APPOINTMENT (OUTPATIENT)
Dept: GENERAL RADIOLOGY | Age: 78
End: 2023-05-01
Attending: EMERGENCY MEDICINE
Payer: MEDICARE

## 2023-05-01 ENCOUNTER — HOSPITAL ENCOUNTER (INPATIENT)
Age: 78
LOS: 4 days | Discharge: HOME | End: 2023-05-05
Attending: EMERGENCY MEDICINE | Admitting: HOSPITALIST
Payer: MEDICARE

## 2023-05-01 ENCOUNTER — APPOINTMENT (OUTPATIENT)
Dept: VASCULAR SURGERY | Age: 78
End: 2023-05-01
Attending: EMERGENCY MEDICINE
Payer: MEDICARE

## 2023-05-01 ENCOUNTER — APPOINTMENT (OUTPATIENT)
Dept: CT IMAGING | Age: 78
End: 2023-05-01
Attending: EMERGENCY MEDICINE
Payer: MEDICARE

## 2023-05-01 DIAGNOSIS — J90 PLEURAL EFFUSION: ICD-10-CM

## 2023-05-01 DIAGNOSIS — J96.01 ACUTE RESPIRATORY FAILURE WITH HYPOXIA (HCC): Primary | ICD-10-CM

## 2023-05-01 DIAGNOSIS — R06.09 DOE (DYSPNEA ON EXERTION): ICD-10-CM

## 2023-05-01 DIAGNOSIS — I50.32 CHF (CONGESTIVE HEART FAILURE), NYHA CLASS I, CHRONIC, DIASTOLIC (HCC): ICD-10-CM

## 2023-05-01 DIAGNOSIS — R60.0 PEDAL EDEMA: ICD-10-CM

## 2023-05-01 DIAGNOSIS — I51.3 RIGHT ATRIAL THROMBUS: ICD-10-CM

## 2023-05-01 PROBLEM — R06.02 SOB (SHORTNESS OF BREATH): Status: ACTIVE | Noted: 2023-05-01

## 2023-05-01 LAB
ALBUMIN SERPL-MCNC: 4.1 G/DL (ref 3.5–5)
ALBUMIN/GLOB SERPL: 1.2 (ref 1.1–2.2)
ALP SERPL-CCNC: 88 U/L (ref 45–117)
ALT SERPL-CCNC: 18 U/L (ref 12–78)
ANION GAP SERPL CALC-SCNC: 6 MMOL/L (ref 5–15)
AST SERPL-CCNC: 31 U/L (ref 15–37)
BASOPHILS # BLD: 0 K/UL (ref 0–0.1)
BASOPHILS NFR BLD: 1 % (ref 0–1)
BILIRUB SERPL-MCNC: 0.7 MG/DL (ref 0.2–1)
BNP SERPL-MCNC: 954 PG/ML
BUN SERPL-MCNC: 16 MG/DL (ref 6–20)
BUN/CREAT SERPL: 16 (ref 12–20)
CALCIUM SERPL-MCNC: 8.9 MG/DL (ref 8.5–10.1)
CHLORIDE SERPL-SCNC: 99 MMOL/L (ref 97–108)
CO2 SERPL-SCNC: 22 MMOL/L (ref 21–32)
COMMENT, HOLDF: NORMAL
CREAT SERPL-MCNC: 0.97 MG/DL (ref 0.55–1.02)
DIFFERENTIAL METHOD BLD: ABNORMAL
EOSINOPHIL # BLD: 0 K/UL (ref 0–0.4)
EOSINOPHIL NFR BLD: 1 % (ref 0–7)
ERYTHROCYTE [DISTWIDTH] IN BLOOD BY AUTOMATED COUNT: 14.5 % (ref 11.5–14.5)
GLOBULIN SER CALC-MCNC: 3.3 G/DL (ref 2–4)
GLUCOSE SERPL-MCNC: 110 MG/DL (ref 65–100)
HCT VFR BLD AUTO: 34 % (ref 35–47)
HGB BLD-MCNC: 10.9 G/DL (ref 11.5–16)
IMM GRANULOCYTES # BLD AUTO: 0 K/UL (ref 0–0.04)
IMM GRANULOCYTES NFR BLD AUTO: 1 % (ref 0–0.5)
LYMPHOCYTES # BLD: 0.6 K/UL (ref 0.8–3.5)
LYMPHOCYTES NFR BLD: 13 % (ref 12–49)
MCH RBC QN AUTO: 25.8 PG (ref 26–34)
MCHC RBC AUTO-ENTMCNC: 32.1 G/DL (ref 30–36.5)
MCV RBC AUTO: 80.6 FL (ref 80–99)
MONOCYTES # BLD: 0.3 K/UL (ref 0–1)
MONOCYTES NFR BLD: 7 % (ref 5–13)
NEUTS SEG # BLD: 3.4 K/UL (ref 1.8–8)
NEUTS SEG NFR BLD: 77 % (ref 32–75)
NRBC # BLD: 0 K/UL (ref 0–0.01)
NRBC BLD-RTO: 0 PER 100 WBC
PLATELET # BLD AUTO: 231 K/UL (ref 150–400)
PMV BLD AUTO: 9.3 FL (ref 8.9–12.9)
POTASSIUM SERPL-SCNC: 3.6 MMOL/L (ref 3.5–5.1)
PROT SERPL-MCNC: 7.4 G/DL (ref 6.4–8.2)
RBC # BLD AUTO: 4.22 M/UL (ref 3.8–5.2)
RBC MORPH BLD: ABNORMAL
SAMPLES BEING HELD,HOLD: NORMAL
SODIUM SERPL-SCNC: 127 MMOL/L (ref 136–145)
TROPONIN I SERPL HS-MCNC: 34 NG/L (ref 0–51)
WBC # BLD AUTO: 4.3 K/UL (ref 3.6–11)

## 2023-05-01 PROCEDURE — 74011250636 HC RX REV CODE- 250/636: Performed by: HOSPITALIST

## 2023-05-01 PROCEDURE — 84484 ASSAY OF TROPONIN QUANT: CPT

## 2023-05-01 PROCEDURE — 65270000046 HC RM TELEMETRY

## 2023-05-01 PROCEDURE — 83880 ASSAY OF NATRIURETIC PEPTIDE: CPT

## 2023-05-01 PROCEDURE — 85025 COMPLETE CBC W/AUTO DIFF WBC: CPT

## 2023-05-01 PROCEDURE — 99285 EMERGENCY DEPT VISIT HI MDM: CPT

## 2023-05-01 PROCEDURE — 74011250637 HC RX REV CODE- 250/637: Performed by: HOSPITALIST

## 2023-05-01 PROCEDURE — 71250 CT THORAX DX C-: CPT

## 2023-05-01 PROCEDURE — 93005 ELECTROCARDIOGRAM TRACING: CPT

## 2023-05-01 PROCEDURE — 71046 X-RAY EXAM CHEST 2 VIEWS: CPT

## 2023-05-01 PROCEDURE — 80053 COMPREHEN METABOLIC PANEL: CPT

## 2023-05-01 PROCEDURE — 93970 EXTREMITY STUDY: CPT

## 2023-05-01 PROCEDURE — 36415 COLL VENOUS BLD VENIPUNCTURE: CPT

## 2023-05-01 PROCEDURE — 99223 1ST HOSP IP/OBS HIGH 75: CPT | Performed by: SPECIALIST

## 2023-05-01 PROCEDURE — 74011000250 HC RX REV CODE- 250: Performed by: HOSPITALIST

## 2023-05-01 RX ORDER — FUROSEMIDE 10 MG/ML
40 INJECTION INTRAMUSCULAR; INTRAVENOUS 2 TIMES DAILY
Status: DISCONTINUED | OUTPATIENT
Start: 2023-05-01 | End: 2023-05-03

## 2023-05-01 RX ORDER — ENOXAPARIN SODIUM 100 MG/ML
40 INJECTION SUBCUTANEOUS DAILY
Status: DISCONTINUED | OUTPATIENT
Start: 2023-05-02 | End: 2023-05-05 | Stop reason: HOSPADM

## 2023-05-01 RX ORDER — SODIUM CHLORIDE 0.9 % (FLUSH) 0.9 %
5-40 SYRINGE (ML) INJECTION EVERY 8 HOURS
Status: DISCONTINUED | OUTPATIENT
Start: 2023-05-01 | End: 2023-05-05 | Stop reason: HOSPADM

## 2023-05-01 RX ORDER — DOXAZOSIN 2 MG/1
2 TABLET ORAL
Status: DISCONTINUED | OUTPATIENT
Start: 2023-05-01 | End: 2023-05-05 | Stop reason: HOSPADM

## 2023-05-01 RX ORDER — AMLODIPINE BESYLATE 5 MG/1
5 TABLET ORAL DAILY
Status: CANCELLED | OUTPATIENT
Start: 2023-05-02

## 2023-05-01 RX ORDER — SERTRALINE HYDROCHLORIDE 50 MG/1
100 TABLET, FILM COATED ORAL DAILY
Status: DISCONTINUED | OUTPATIENT
Start: 2023-05-02 | End: 2023-05-05 | Stop reason: HOSPADM

## 2023-05-01 RX ORDER — ACETAMINOPHEN 325 MG/1
650 TABLET ORAL
Status: DISCONTINUED | OUTPATIENT
Start: 2023-05-01 | End: 2023-05-05 | Stop reason: HOSPADM

## 2023-05-01 RX ORDER — POLYETHYLENE GLYCOL 3350 17 G/17G
17 POWDER, FOR SOLUTION ORAL DAILY PRN
Status: DISCONTINUED | OUTPATIENT
Start: 2023-05-01 | End: 2023-05-05 | Stop reason: HOSPADM

## 2023-05-01 RX ORDER — ONDANSETRON 2 MG/ML
4 INJECTION INTRAMUSCULAR; INTRAVENOUS
Status: DISCONTINUED | OUTPATIENT
Start: 2023-05-01 | End: 2023-05-05 | Stop reason: HOSPADM

## 2023-05-01 RX ORDER — LEVOTHYROXINE SODIUM 125 UG/1
125 TABLET ORAL
Status: DISCONTINUED | OUTPATIENT
Start: 2023-05-02 | End: 2023-05-01

## 2023-05-01 RX ORDER — LORAZEPAM 0.5 MG/1
0.5 TABLET ORAL
Status: DISCONTINUED | OUTPATIENT
Start: 2023-05-01 | End: 2023-05-05 | Stop reason: HOSPADM

## 2023-05-01 RX ORDER — ACETAMINOPHEN 650 MG/1
650 SUPPOSITORY RECTAL
Status: DISCONTINUED | OUTPATIENT
Start: 2023-05-01 | End: 2023-05-05 | Stop reason: HOSPADM

## 2023-05-01 RX ORDER — OXYBUTYNIN CHLORIDE 5 MG/1
5 TABLET ORAL 2 TIMES DAILY
Status: DISCONTINUED | OUTPATIENT
Start: 2023-05-01 | End: 2023-05-01

## 2023-05-01 RX ORDER — LEVOTHYROXINE SODIUM 112 UG/1
112 TABLET ORAL
Status: DISCONTINUED | OUTPATIENT
Start: 2023-05-02 | End: 2023-05-05 | Stop reason: HOSPADM

## 2023-05-01 RX ORDER — SODIUM CHLORIDE 0.9 % (FLUSH) 0.9 %
5-40 SYRINGE (ML) INJECTION AS NEEDED
Status: DISCONTINUED | OUTPATIENT
Start: 2023-05-01 | End: 2023-05-05 | Stop reason: HOSPADM

## 2023-05-01 RX ORDER — ONDANSETRON 4 MG/1
4 TABLET, ORALLY DISINTEGRATING ORAL
Status: DISCONTINUED | OUTPATIENT
Start: 2023-05-01 | End: 2023-05-05 | Stop reason: HOSPADM

## 2023-05-01 RX ADMIN — DOXAZOSIN 2 MG: 2 TABLET ORAL at 22:45

## 2023-05-01 RX ADMIN — FUROSEMIDE 40 MG: 10 INJECTION, SOLUTION INTRAMUSCULAR; INTRAVENOUS at 22:18

## 2023-05-01 RX ADMIN — Medication 10 ML: at 22:00

## 2023-05-01 NOTE — H&P
History & Physical    Primary Care Provider: Mary Mendez NP  Source of Information: Patient   CC; Bilateral lower extremity edema    History of Presenting Illness:   Otf Nolen is a [de-identified] y.o. female with past medical history of CML, hypothyroidism, hypertension, anxiety and depression who presented to the ED with the complaint of bilateral lower extremity swelling and worsening SOB. She states leg swelling has been worsening over the last couple of days. Reports orthopnea although she says she is unable to lay down flat. No fever or chills    ER course: VS temp 97.5, Pulse 80, RR: 1, /65mm hg, oxygen 88%- 96 on 2-4 liters of oxygen  Blood work; , pro   CXR moderate size left pleural effusion and left basilar consolidation  CT chest: shows left pleural effusion, small pericardial effusion and right apical nodule         Review of Systems:  Constitutional: no fever or chills  Resp; +ve SOB.  No hemoptyisis  Cardiac: No chest pain  Abd: no abdominal pain, nausea or vomiting  Psych: +ve anxiety    Past Medical History:   Diagnosis Date    Acquired hypothyroidism     Atrophic gastritis     Cobalamin deficiency     Essential hypertension     Fracture     left hip/ coccyx    Genital herpes simplex     Heart murmur     Herpes labialis     Hypertension     Irritable bowel syndrome     Myeloid leukemia in remission ([address]     Pemphigoid     Pernicious anemia     Seborrheic dermatitis of scalp     Urinary incontinence       Past Surgical History:   Procedure Laterality Date    HX  SECTION      HX CHOLECYSTECTOMY      HX COLONOSCOPY  2012    repeat in 10 years    HX ENDOSCOPY  1999    HX HIP REPLACEMENT      HX HYSTERECTOMY      HX HYSTERECTOMY      HX ORTHOPAEDIC Right 2011    thumb joint replacement    HX ORTHOPAEDIC Left 2009    thumb joint replacement    HX TONSILLECTOMY      IL SPHINCTEROTOMY ANAL DIVISION SPHINCTER SPX Prior to Admission medications    Medication Sig Start Date End Date Taking? Authorizing Provider   cyanocobalamin (VITAMIN B12) 1,000 mcg/mL injection ADMINISTER 1 ML INTO MUSCLE ONCE MONTHLY AS DIRECTED 4/21/23   Paddy Castellon NP   levothyroxine (SYNTHROID) 125 mcg tablet TAKE 1 TABLET BY MOUTH DAILY BEFORE AND BREAKFAST 4/12/23   Paddy Castellon NP   dicyclomine (BENTYL) 10 mg capsule Take 1 Capsule by mouth daily. Patient not taking: Reported on 5/1/2023 4/10/23   Paddy Castellon NP   LORazepam (ATIVAN) 0.5 mg tablet TAKE 1 TABLET BY MOUTH EVERY 8 HOURS AS NEEDED FOR ANXIETY. MAX DAILY AMOUNT: 1.5 MG 3/29/23   Jazmine Jackson MD   valACYclovir (VALTREX) 500 mg tablet TAKE 1 TABLET BY MOUTH TWICE DAILY FOR 3 DAYS. MAX 6 DOSES PER OUTBREAK  Patient not taking: Reported on 5/1/2023 3/29/23   Jazmine Jackson MD   doxazosin (CARDURA) 2 mg tablet Take 1 Tablet by mouth nightly. 3/9/23   Jazmine Jackson MD   amLODIPine (NORVASC) 5 mg tablet Take 1 Tablet by mouth daily. 10/27/22   Jazmine Jackson MD   sertraline (ZOLOFT) 100 mg tablet Take 1 Tablet by mouth daily.  10/27/22   Jazmine Jackson MD   oxybutynin (DITROPAN) 5 mg tablet TAKE 1 TABLET BY MOUTH TWICE DAILY FOR URINARY CONTINENCE  Patient not taking: Reported on 5/1/2023 10/15/20   Paddy Castellon NP   Insulin Syringe-Needle U-100 1 mL 31 gauge x 5/16 syrg insulin syringe U-100 with needle 1 mL 31 gauge x 5/16\"    Provider, Historical   dasatinib (SpryceL) 100 mg tab Sprycel 100 mg tablet 10/22/18   Provider, Historical     Allergies   Allergen Reactions    Adacel [Diphth,Pertus(Acell),Tetanus] Other (comments)     Arm swelling; redness    Pcn [Penicillins] Itching      Family History   Problem Relation Age of Onset    Breast Cancer Sister     Hypertension Sister     Diabetes Mother     Cancer Father     Other Brother         leukemia        SOCIAL HISTORY:  Patient resides: She lives with daughter  Independently x Assisted Living    SNF    With family care       Smoking history:   None x   Former    Chronic      Alcohol history:   None x   Social    Chronic      Ambulates:   Independently x   w/cane    w/walker    w/wc    CODE STATUS:  DNR    Full x   Other      Objective:     Physical Exam:     Visit Vitals  BP (!) 142/65 (BP 1 Location: Left upper arm, BP Patient Position: Lying)   Pulse 87   Temp 97.6 °F (36.4 °C)   Resp 16   Ht 5' 4\" (1.626 m)   Wt 73 kg (161 lb)   SpO2 95%   BMI 27.64 kg/m²    O2 Flow Rate (L/min): 4 l/min O2 Device: Nasal cannula    General:  Alert, cooperative, no distress, appears stated age. Head:  Normocephalic, without obvious abnormality, atraumatic. Eyes:  Conjunctivae/corneas clear. PERRL, EOMs intact. Nose: Nares normal. Septum midline. Mucosa normal. No drainage or sinus tenderness. Throat: Lips, mucosa, and tongue normal. Teeth and gums normal.   Neck: Supple, symmetrical, trachea midline, no adenopathy, thyroid: no enlargement/tenderness/nodules, no carotid bruit and no JVD. Back:   Symmetric, no curvature. ROM normal. No CVA tenderness. Lungs:   Clear to auscultation bilaterally. Chest wall:  No tenderness or deformity. Heart:  Regular rate and rhythm, S1, S2 normal, no murmur, click, rub or gallop. Abdomen:   Soft, non-tender. Bowel sounds normal. No masses,  No organomegaly. Extremities: Extremities normal, atraumatic, no cyanosis. 3 + edema up to the knees   Pulses: 2+ and symmetric all extremities. Skin: Skin color, texture, turgor normal. No rashes or lesions   Neurologic: CNII-XII intact. Data Review:     Recent Days:  Recent Labs     05/01/23  1245   WBC 4.3   HGB 10.9*   HCT 34.0*        Recent Labs     05/01/23  1245   *   K 3.6   CL 99   CO2 22   *   BUN 16   CREA 0.97   CA 8.9   ALB 4.1   ALT 18     No results for input(s): PH, PCO2, PO2, HCO3, FIO2 in the last 72 hours.     24 Hour Results:  Recent Results (from the past 24 hour(s))   EKG, 12 LEAD, INITIAL    Collection Time: 05/01/23 12:24 PM   Result Value Ref Range    Ventricular Rate 90 BPM    Atrial Rate 90 BPM    P-R Interval 254 ms    QRS Duration 128 ms    Q-T Interval 394 ms    QTC Calculation (Bezet) 481 ms    Calculated P Axis 54 degrees    Calculated R Axis -78 degrees    Calculated T Axis 52 degrees    Diagnosis       Sinus rhythm with marked sinus arrhythmia with 1st degree AV block  Left axis deviation  Right bundle branch block  Inferior infarct , age undetermined  Anterior infarct , age undetermined  Abnormal ECG  No previous ECGs available     CBC WITH AUTOMATED DIFF    Collection Time: 05/01/23 12:45 PM   Result Value Ref Range    WBC 4.3 3.6 - 11.0 K/uL    RBC 4.22 3.80 - 5.20 M/uL    HGB 10.9 (L) 11.5 - 16.0 g/dL    HCT 34.0 (L) 35.0 - 47.0 %    MCV 80.6 80.0 - 99.0 FL    MCH 25.8 (L) 26.0 - 34.0 PG    MCHC 32.1 30.0 - 36.5 g/dL    RDW 14.5 11.5 - 14.5 %    PLATELET 870 478 - 477 K/uL    MPV 9.3 8.9 - 12.9 FL    NRBC 0.0 0  WBC    ABSOLUTE NRBC 0.00 0.00 - 0.01 K/uL    NEUTROPHILS 77 (H) 32 - 75 %    LYMPHOCYTES 13 12 - 49 %    MONOCYTES 7 5 - 13 %    EOSINOPHILS 1 0 - 7 %    BASOPHILS 1 0 - 1 %    IMMATURE GRANULOCYTES 1 (H) 0.0 - 0.5 %    ABS. NEUTROPHILS 3.4 1.8 - 8.0 K/UL    ABS. LYMPHOCYTES 0.6 (L) 0.8 - 3.5 K/UL    ABS. MONOCYTES 0.3 0.0 - 1.0 K/UL    ABS. EOSINOPHILS 0.0 0.0 - 0.4 K/UL    ABS. BASOPHILS 0.0 0.0 - 0.1 K/UL    ABS. IMM.  GRANS. 0.0 0.00 - 0.04 K/UL    DF SMEAR SCANNED      RBC COMMENTS NORMOCYTIC, NORMOCHROMIC     METABOLIC PANEL, COMPREHENSIVE    Collection Time: 05/01/23 12:45 PM   Result Value Ref Range    Sodium 127 (L) 136 - 145 mmol/L    Potassium 3.6 3.5 - 5.1 mmol/L    Chloride 99 97 - 108 mmol/L    CO2 22 21 - 32 mmol/L    Anion gap 6 5 - 15 mmol/L    Glucose 110 (H) 65 - 100 mg/dL    BUN 16 6 - 20 MG/DL    Creatinine 0.97 0.55 - 1.02 MG/DL    BUN/Creatinine ratio 16 12 - 20      eGFR 59 (L) >60 ml/min/1.73m2    Calcium 8.9 8.5 - 10.1 MG/DL    Bilirubin, total 0.7 0.2 - 1.0 MG/DL    ALT (SGPT) 18 12 - 78 U/L    AST (SGOT) 31 15 - 37 U/L    Alk. phosphatase 88 45 - 117 U/L    Protein, total 7.4 6.4 - 8.2 g/dL    Albumin 4.1 3.5 - 5.0 g/dL    Globulin 3.3 2.0 - 4.0 g/dL    A-G Ratio 1.2 1.1 - 2.2     TROPONIN-HIGH SENSITIVITY    Collection Time: 05/01/23 12:45 PM   Result Value Ref Range    Troponin-High Sensitivity 34 0 - 51 ng/L   NT-PRO BNP    Collection Time: 05/01/23 12:45 PM   Result Value Ref Range    NT pro- (H) <450 PG/ML   SAMPLES BEING HELD    Collection Time: 05/01/23 12:45 PM   Result Value Ref Range    SAMPLES BEING HELD [email or web address]     COMMENT        Add-on orders for these samples will be processed based on acceptable specimen integrity and analyte stability, which may vary by analyte. Imaging:     Assessment:     Principal Problem:    CURRAN (dyspnea on exertion) (5/1/2023)    Active Problems:    SOB (shortness of breath) (5/1/2023)           Plan:     1. Dyspnea on exertion, bilateral lower extremity edema: Suspect due to CHF exacerbation in view of elevated pro BNP. We will empirically start on lasix 40 mg I.V BID  Obtain 2 d echo  Consult cardiology    2. CML: Resume home sprycel. Outpatient follow up with oncology    3. Hypothyroidism: continue synthroid    4. Anxiety/depression; resume home sertraline, and lorezapam prn.     5. DVT PPX; Lovenox    6. Dispo:  Admit patient to tele  Surrogate decision maker: DaughterEliana bland  Code Status: full       Signed By: Javier Hoffman MD     May 1, 2023

## 2023-05-01 NOTE — ED PROVIDER NOTES
Ms. Shabnam Husain is an [de-identified] female who presents to the ER with complaints of bilateral leg swelling and shortness of breath. She has noticed that both of her legs have been swollen for the last 5-6 days. She had previously had leg swelling when she was diagnosed with CML approximately 10 years ago, but she does not normally have this degree of swelling. She has also felt SOB, especially with exertion recently. She cannot walk 50 feet without having to stop. She has had to stop due to SOB. She does not use oxygen at home. She has a history of anxiety and she was attributing her sx to anxiety. No chest pain.   She denies similar sx in the past.         Past Medical History:   Diagnosis Date    Acquired hypothyroidism     Atrophic gastritis     Cobalamin deficiency     Essential hypertension     Fracture     left hip/ coccyx    Genital herpes simplex     Heart murmur     Herpes labialis     Hypertension     Irritable bowel syndrome     Myeloid leukemia in remission (HCC)     Pemphigoid     Pernicious anemia     Seborrheic dermatitis of scalp     Urinary incontinence        Past Surgical History:   Procedure Laterality Date    HX  SECTION      HX CHOLECYSTECTOMY      HX COLONOSCOPY  2012    repeat in 10 years    HX ENDOSCOPY  1999    HX HIP REPLACEMENT      HX HYSTERECTOMY      HX HYSTERECTOMY      HX ORTHOPAEDIC Right 2011    thumb joint replacement    HX ORTHOPAEDIC Left 2009    thumb joint replacement    HX TONSILLECTOMY      OR SPHINCTEROTOMY ANAL DIVISION SPHINCTER SPX           Family History:   Problem Relation Age of Onset    Breast Cancer Sister     Hypertension Sister     Diabetes Mother     Cancer Father     Other Brother         leukemia       Social History     Socioeconomic History    Marital status:      Spouse name: Not on file    Number of children: Not on file    Years of education: Not on file    Highest education level: Not on file   Occupational History    Not on file   Tobacco Use    Smoking status: Never    Smokeless tobacco: Never   Vaping Use    Vaping Use: Never used   Substance and Sexual Activity    Alcohol use: Not Currently    Drug use: No    Sexual activity: Not Currently     Partners: Male   Other Topics Concern    Not on file   Social History Narrative    Not on file     Social Determinants of Health     Financial Resource Strain: Low Risk     Difficulty of Paying Living Expenses: Not hard at all   Food Insecurity: No Food Insecurity    Worried About Running Out of Food in the Last Year: Never true    Ran Out of Food in the Last Year: Never true   Transportation Needs: Not on file   Physical Activity: Not on file   Stress: Not on file   Social Connections: Not on file   Intimate Partner Violence: Not on file   Housing Stability: Not on file         ALLERGIES: Adacel [diphth,pertus(acell),tetanus] and Pcn [penicillins]    Review of Systems   Constitutional:  Negative for chills and fever. HENT:  Negative for rhinorrhea and sore throat. Respiratory:  Positive for shortness of breath. Negative for cough. Cardiovascular:  Positive for leg swelling. Negative for chest pain. Gastrointestinal:  Negative for abdominal pain, diarrhea, nausea and vomiting. Genitourinary:  Negative for dysuria and urgency. Musculoskeletal:  Negative for arthralgias and back pain. Skin:  Negative for rash. Neurological:  Negative for dizziness, weakness and light-headedness. Vitals:    05/01/23 1220 05/01/23 1240   BP:  (!) 150/63   Pulse: 80 89   Resp: 17 16   Temp:  97.5 °F (36.4 °C)   SpO2: 92% (!) 88%   Weight:  73 kg (161 lb)   Height:  5' 4\" (1.626 m)            Physical Exam       Vital signs reviewed. Nursing notes reviewed.     Const:  No acute distress, well developed, well nourished  Head:  Atraumatic, normocephalic  Eyes:  PERRL, conjunctiva normal, no scleral icterus  Neck:  Supple, trachea midline  Cardiovascular:  normal rate  Resp:  mild resp distress, + increased work of breathing, no wheezes, no rhonchi, no rales,  Abd:  Soft, non-tender, non-distended  MSK:  No pedal edema, normal ROM  Neuro:  Alert and oriented x3, no cranial nerve defect  Skin:  Warm, dry, intact  Psych: normal mood and affect, behavior is normal, judgement and thought content is normal        Medical Decision Making  Amount and/or Complexity of Data Reviewed  Independent Historian: caregiver     Details: daughter  External Data Reviewed: notes. Labs: ordered. Radiology: ordered. ECG/medicine tests: ordered. Risk  Decision regarding hospitalization. Procedures        Perfect Serve Consult for Admission  2:09 PM    ED Room Number: D26/D26  Patient Name and age:  Winnie Mills [de-identified] y.o.  female  Working Diagnosis:   1. Acute respiratory failure with hypoxia ([address]    2. Pleural effusion    3. Pedal edema        COVID-19 Suspicion:  no  Sepsis present:  no  Reassessment needed: no  Readmission: no  Isolation Requirements:  no  Recommended Level of Care:  telemetry  Department: [facility] - [phone]  Other:  oxygen sats in the upper 80s at rest.  Xray shows effusion. Will get a CT without contrast to further characterize. Total critical care time spent exclusive of procedures:  35 minutes. Ms. Libby Perez is an [de-identified] female who presents to the ER with complaints of bilateral leg swelling and shortness of breath. She was found hypoxic in the upper 80s, 87%, at rest.  Her oxygen saturations improved with supplemental oxygen. X-ray shows a pleural effusion and consolidation. We will get a CT scan to further characterize. At this time, I have not ordered antibiotics as she does not have a fever or white count.   She is to be evaluated for admission by the hospitalist.

## 2023-05-01 NOTE — PROGRESS NOTES
[facility]                    Cardiology Care Note     [x]Initial Encounter     []Follow-up    Patient Name: Jimmy Shelby - FAW:[id number] - KDM:[id number]  Primary Cardiologist:   Consulting Cardiologist: [facility] Physicians: Carito Haas MD     Reason for encounter: Dyspnea/edema    HPI:       Jimmy Shelby is a [de-identified] y.o. female with PMH significant for HTN, CML, anxiety/depression who presented to the ER with complaints of bilateral leg swelling and shortness of breath. She has noticed that both of her legs have been swollen for the last 5-6 days. [de-identified] oh ox obtained from the daughter, apparently she wears compression stockings but lately they have been too tight. She had previously had leg swelling when she was diagnosed with CML approximately 10 years ago, but she does not normally have this degree of swelling. She has also felt SOB, especially with exertion recently. She cannot walk 50 feet without having to stop. Dtr says she has noticed pursed lip breathing, increase in her anxiety level. She also overheard her mother on phone with her friend Saturday saying if she didn't feel better she was going to kill herself and made a comment to her dtr that \"now I know why people commit suicide\"   Per dtr she has not been able to get the pt's PCP to renew the pt's ativan. Year ago after a hip surgery she was diagnosed with a BBB, vague re cardiac work up, thinks she may have seen Dr Kailey Xie. Pt become very anxious with any discussion of medical issues. BP runs high frequently at home per dtr. 24# unintentional weight loss the past year. Subjective:      Jimmy Shelby reports dyspnea, lower extremity edema. Assessment and Plan     1 Large left pleural effusion on CT associated with worsening SOB/edema. pBNP 954. Recommend ECHO , diuresis as you are doing.  Ischemic eval pending clinical course.   2 Hx HTN: consider stopping norvasc with LE edema. 3 Hx CML: labs stable 5 yrs per dtr. 4 anxiety/depression/ dtr reports pt stating she wanted to kill herself if she didn't feel better. 5 suicidal ideation: consult psychiatry          ____________________________________________________________    Cardiac testing      CT Results (most recent):  Results from Hospital Encounter encounter on 05/01/23    CT CHEST WO CONT    Narrative  INDICATION: Shortness of breath    COMPARISON: None    CONTRAST: None. TECHNIQUE:  5 mm axial images were obtained through the chest. Coronal and  sagittal reformats were generated. CT dose reduction was achieved through use  of a standardized protocol tailored for this examination and automatic exposure  control for dose modulation. The absence of intravenous contrast reduces the sensitivity for evaluation of  the mediastinum, zo, vasculature, and upper abdominal organs. FINDINGS:    CHEST WALL: No mass or axillary lymphadenopathy. THYROID: No nodule. MEDIASTINUM: No mass or lymphadenopathy. ZO: No mass or lymphadenopathy. THORACIC AORTA: No aneurysm. MAIN PULMONARY ARTERY: Normal in caliber. TRACHEA/BRONCHI: Patent. ESOPHAGUS: No wall thickening or dilatation. HEART: Normal in size. Mild coronary arteries calcifications. Small pericardial  effusion. PLEURA: Mild sized left pleural effusion, small right pleural reaction. LUNGS: Right apical 12 mm nodule could be chronic and scarring. INCIDENTALLY IMAGED UPPER ABDOMEN: No significant abnormality in the  incidentally imaged upper abdomen. BONES: No destructive bone lesion. Impression  Left pleural effusion  A small pericardial effusion  Right apical nodule.             Most recent HS troponins:  Recent Labs     05/01/23  1245   TROPHS 34     EKG:   EKG Results       Procedure 720 Value Units Date/Time    EKG, 12 LEAD, INITIAL [[id number]]     Order Status: Sent     EKG 12 LEAD INITIAL [[id number]]     Order Status: Sent               Review of Systems:    [x]All other systems reviewed and all negative except as written in HPI    [] Patient unable to provide secondary to condition    Past Medical History:   Diagnosis Date    Acquired hypothyroidism     Atrophic gastritis     Cobalamin deficiency     Essential hypertension     Fracture     left hip/ coccyx    Genital herpes simplex     Heart murmur     Herpes labialis     Hypertension     Irritable bowel syndrome     Myeloid leukemia in remission ([facility] [address]     Pemphigoid     Pernicious anemia     Seborrheic dermatitis of scalp     Urinary incontinence      Past Surgical History:   Procedure Laterality Date    HX  SECTION      HX CHOLECYSTECTOMY      HX COLONOSCOPY  2012    repeat in 10 years    HX ENDOSCOPY  1999    HX HIP REPLACEMENT      HX HYSTERECTOMY      HX HYSTERECTOMY      HX ORTHOPAEDIC Right 2011    thumb joint replacement    HX ORTHOPAEDIC Left 2009    thumb joint replacement    HX TONSILLECTOMY      WI SPHINCTEROTOMY ANAL DIVISION SPHINCTER SPX       Social Hx:  reports that she has never smoked. She has never used smokeless tobacco. She reports that she does not currently use alcohol. She reports that she does not use drugs. Family Hx: family history includes Breast Cancer in her sister; Cancer in her father; Diabetes in her mother; Hypertension in her sister; Other in her brother.   Allergies   Allergen Reactions    Adacel [Diphth,Pertus(Acell),Tetanus] Other (comments)     Arm swelling; redness    Pcn [Penicillins] Itching          OBJECTIVE:  Wt Readings from Last 3 Encounters:   23 73 kg (161 lb)   22 83.9 kg (185 lb)   21 84.8 kg (187 lb)     No intake or output data in the 24 hours ending 23 1522    Physical Exam:    Vitals:   Vitals:    23 1220 23 1240 23 1244 23 1256   BP:  (!) 150/63     Pulse: 80 89     Resp: 17 16     Temp:  97.5 °F (36.4 °C)     SpO2: 92% (!) 88% 96% 96%   Weight:  73 kg (161 lb)     Height:  5' 4\" (1.626 m)       Telemetry:     Gen: Well-developed, well-nourished, in no acute distress  Neck: Supple, No JVD, No Carotid Bruit  Resp: No accessory muscle use, Clear breath sounds, No rales or rhonchi  Card: Regular Rate,Rythm, Normal S1, S2, No murmurs, rubs or gallop. No thrills. Abd:   Soft, non-tender, non-distended, BS+   MSK: No cyanosis  Skin: No rashes    Neuro: Moving all four extremities, follows commands appropriately  Psych: Good insight, oriented to person, place, alert, anxious   LE: 2+ LE edema    Data Review:     Radiology:   XR Results (most recent):  Results from Hospital Encounter encounter on 05/01/23    XR CHEST PA LAT    Narrative  Indication:  sob    Exam: PA and lateral views of the chest.    Direct comparison is made to prior CXR dated shortness of breath. Findings: Cardiomediastinal silhouette is partially obscured by a moderate sized  left pleural effusion and left basilar consolidation. There is trace right  pleural fluid. Right lung is otherwise clear. There is no pneumothorax. Impression  Moderate size left pleural effusion and left basilar consolidation. Recent Labs     05/01/23  1245   *   K 3.6   CL 99   CO2 22   BUN 16   CREA 0.97   *   CA 8.9     Recent Labs     05/01/23  1245   WBC 4.3   HGB 10.9*   HCT 34.0*        Recent Labs     05/01/23  1245   AP 88     No results for input(s): CHOL, LDLC in the last 72 hours. No lab exists for component: TGL, HDLC,  HBA1C      Current meds:  No current facility-administered medications for this encounter.     Current Outpatient Medications:     cyanocobalamin (VITAMIN B12) 1,000 mcg/mL injection, ADMINISTER 1 ML INTO MUSCLE ONCE MONTHLY AS DIRECTED, Disp: 3 mL, Rfl: 1    levothyroxine (SYNTHROID) 125 mcg tablet, TAKE 1 TABLET BY MOUTH DAILY BEFORE AND BREAKFAST, Disp: 60 Tablet, Rfl: 0    dicyclomine (BENTYL) 10 mg capsule, Take 1 Capsule by mouth daily. (Patient not taking: Reported on 5/1/2023), Disp: 90 Capsule, Rfl: 3    LORazepam (ATIVAN) 0.5 mg tablet, TAKE 1 TABLET BY MOUTH EVERY 8 HOURS AS NEEDED FOR ANXIETY. MAX DAILY AMOUNT: 1.5 MG, Disp: 15 Tablet, Rfl: 0    valACYclovir (VALTREX) 500 mg tablet, TAKE 1 TABLET BY MOUTH TWICE DAILY FOR 3 DAYS. MAX 6 DOSES PER OUTBREAK (Patient not taking: Reported on 5/1/2023), Disp: 18 Tablet, Rfl: 0    doxazosin (CARDURA) 2 mg tablet, Take 1 Tablet by mouth nightly., Disp: 90 Tablet, Rfl: 1    amLODIPine (NORVASC) 5 mg tablet, Take 1 Tablet by mouth daily. , Disp: 90 Tablet, Rfl: 1    sertraline (ZOLOFT) 100 mg tablet, Take 1 Tablet by mouth daily. , Disp: 90 Tablet, Rfl: 2    oxybutynin (DITROPAN) 5 mg tablet, TAKE 1 TABLET BY MOUTH TWICE DAILY FOR URINARY CONTINENCE (Patient not taking: Reported on 5/1/2023), Disp: 180 Tab, Rfl: 3    Insulin Syringe-Needle U-100 1 mL 31 gauge x 5/16 syrg, insulin syringe U-100 with needle 1 mL 31 gauge x 5/16\", Disp: , Rfl:     dasatinib (SpryceL) 100 mg tab, Sprycel 100 mg tablet, Disp: , Rfl:     Phoenix Cloud NP    [facility]  Call center: [phone] (j) [phone]      Hayden Gilmore NP

## 2023-05-01 NOTE — ED TRIAGE NOTES
Patient presents with her family- Patients family reports the patient has been having B/L leg swelling since Saturday-     Patient reports she has severe anxiety- used to take lorazepam but her dr has not refilled it lately.     Patient also reports shortness of breath which is worse with exertion

## 2023-05-01 NOTE — ED NOTES
Patient states does not think she needs O2 anymore. Up and ambulated in room on room air, sats dropped to 84%. O2 replaced at 2 liters and came up to 89%, turned up to 4 liters and patient improved to 95%.

## 2023-05-01 NOTE — ED NOTES
Verbal shift change report given to Charlotte Palmer RN (oncoming nurse) by Grzegorz Hi RN (offgoing nurse). Report included the following information SBAR, Kardex, ED Summary, MAR, and Recent Results.

## 2023-05-02 PROBLEM — J90 PLEURAL EFFUSION, LEFT: Status: ACTIVE | Noted: 2023-05-02

## 2023-05-02 PROBLEM — E87.1 HYPONATREMIA WITH EXTRACELLULAR FLUID DEPLETION: Status: ACTIVE | Noted: 2023-05-02

## 2023-05-02 PROBLEM — D64.9 ANEMIA: Status: ACTIVE | Noted: 2023-05-02

## 2023-05-02 PROBLEM — R45.851 DEPRESSION WITH SUICIDAL IDEATION: Status: ACTIVE | Noted: 2023-05-02

## 2023-05-02 PROBLEM — M79.89 SWELLING OF LOWER EXTREMITY: Status: ACTIVE | Noted: 2023-05-02

## 2023-05-02 PROBLEM — E87.6 HYPOKALEMIA DUE TO LOSS OF POTASSIUM: Status: ACTIVE | Noted: 2023-05-02

## 2023-05-02 PROBLEM — F32.A DEPRESSION WITH SUICIDAL IDEATION: Status: ACTIVE | Noted: 2023-05-02

## 2023-05-02 PROBLEM — E87.1 HYPONATREMIA: Status: ACTIVE | Noted: 2023-05-02

## 2023-05-02 LAB
ANION GAP SERPL CALC-SCNC: 6 MMOL/L (ref 5–15)
BASOPHILS # BLD: 0 K/UL (ref 0–0.1)
BASOPHILS NFR BLD: 1 % (ref 0–1)
BUN SERPL-MCNC: 16 MG/DL (ref 6–20)
BUN/CREAT SERPL: 16 (ref 12–20)
CALCIUM SERPL-MCNC: 8 MG/DL (ref 8.5–10.1)
CHLORIDE SERPL-SCNC: 101 MMOL/L (ref 97–108)
CO2 SERPL-SCNC: 24 MMOL/L (ref 21–32)
CREAT SERPL-MCNC: 0.99 MG/DL (ref 0.55–1.02)
DIFFERENTIAL METHOD BLD: ABNORMAL
EOSINOPHIL # BLD: 0.1 K/UL (ref 0–0.4)
EOSINOPHIL NFR BLD: 1 % (ref 0–7)
ERYTHROCYTE [DISTWIDTH] IN BLOOD BY AUTOMATED COUNT: 14.4 % (ref 11.5–14.5)
GLUCOSE SERPL-MCNC: 82 MG/DL (ref 65–100)
HCT VFR BLD AUTO: 24.7 % (ref 35–47)
HCT VFR BLD AUTO: 31 % (ref 35–47)
HGB BLD-MCNC: 7.8 G/DL (ref 11.5–16)
HGB BLD-MCNC: 9.9 G/DL (ref 11.5–16)
IMM GRANULOCYTES # BLD AUTO: 0 K/UL (ref 0–0.04)
IMM GRANULOCYTES NFR BLD AUTO: 0 % (ref 0–0.5)
LYMPHOCYTES # BLD: 0.6 K/UL (ref 0.8–3.5)
LYMPHOCYTES NFR BLD: 14 % (ref 12–49)
MCH RBC QN AUTO: 25.8 PG (ref 26–34)
MCHC RBC AUTO-ENTMCNC: 31.6 G/DL (ref 30–36.5)
MCV RBC AUTO: 81.8 FL (ref 80–99)
MONOCYTES # BLD: 0.6 K/UL (ref 0–1)
MONOCYTES NFR BLD: 14 % (ref 5–13)
NEUTS SEG # BLD: 2.9 K/UL (ref 1.8–8)
NEUTS SEG NFR BLD: 70 % (ref 32–75)
NRBC # BLD: 0 K/UL (ref 0–0.01)
NRBC BLD-RTO: 0 PER 100 WBC
PLATELET # BLD AUTO: 180 K/UL (ref 150–400)
PMV BLD AUTO: 9.5 FL (ref 8.9–12.9)
POTASSIUM SERPL-SCNC: 3.4 MMOL/L (ref 3.5–5.1)
RBC # BLD AUTO: 3.02 M/UL (ref 3.8–5.2)
SODIUM SERPL-SCNC: 131 MMOL/L (ref 136–145)
WBC # BLD AUTO: 4.1 K/UL (ref 3.6–11)

## 2023-05-02 PROCEDURE — 74011250636 HC RX REV CODE- 250/636: Performed by: HOSPITALIST

## 2023-05-02 PROCEDURE — 74011000250 HC RX REV CODE- 250: Performed by: HOSPITALIST

## 2023-05-02 PROCEDURE — 85018 HEMOGLOBIN: CPT

## 2023-05-02 PROCEDURE — 65270000046 HC RM TELEMETRY

## 2023-05-02 PROCEDURE — 77010033678 HC OXYGEN DAILY

## 2023-05-02 PROCEDURE — APPNB15 APP NON BILLABLE TIME 0-15 MINS: Performed by: NURSE PRACTITIONER

## 2023-05-02 PROCEDURE — 94761 N-INVAS EAR/PLS OXIMETRY MLT: CPT

## 2023-05-02 PROCEDURE — 85025 COMPLETE CBC W/AUTO DIFF WBC: CPT

## 2023-05-02 PROCEDURE — 80048 BASIC METABOLIC PNL TOTAL CA: CPT

## 2023-05-02 PROCEDURE — 74011250637 HC RX REV CODE- 250/637: Performed by: HOSPITALIST

## 2023-05-02 PROCEDURE — 36415 COLL VENOUS BLD VENIPUNCTURE: CPT

## 2023-05-02 RX ORDER — LANOLIN ALCOHOL/MO/W.PET/CERES
3 CREAM (GRAM) TOPICAL ONCE
Status: DISPENSED | OUTPATIENT
Start: 2023-05-02 | End: 2023-05-02

## 2023-05-02 RX ADMIN — FUROSEMIDE 40 MG: 10 INJECTION, SOLUTION INTRAMUSCULAR; INTRAVENOUS at 17:43

## 2023-05-02 RX ADMIN — SERTRALINE 100 MG: 50 TABLET, FILM COATED ORAL at 08:10

## 2023-05-02 RX ADMIN — Medication 10 ML: at 08:11

## 2023-05-02 RX ADMIN — Medication 10 ML: at 22:00

## 2023-05-02 RX ADMIN — LEVOTHYROXINE SODIUM 112 MCG: 0.11 TABLET ORAL at 09:50

## 2023-05-02 RX ADMIN — DOXAZOSIN 2 MG: 2 TABLET ORAL at 21:58

## 2023-05-02 RX ADMIN — FUROSEMIDE 40 MG: 10 INJECTION, SOLUTION INTRAMUSCULAR; INTRAVENOUS at 08:10

## 2023-05-02 RX ADMIN — ENOXAPARIN SODIUM 40 MG: 100 INJECTION SUBCUTANEOUS at 08:10

## 2023-05-02 RX ADMIN — LORAZEPAM 0.5 MG: 0.5 TABLET ORAL at 02:00

## 2023-05-02 NOTE — PROGRESS NOTES
[facility]  [address] [facility], [address] [place] [address]  [phone]      Hospitalist  Progress Note      NAME:         Clarisa Bui   :          MRM:        [id number]    Date of service: 2023      Subjective: Patient seen and examined by me. Patient admitted with CHF exacerbation. She says she feels a little better although still has some SOB. No cough or fever. Usually not on home oxygen. She denies any suicidal ideation.       Objective:    Vital Signs:    Visit Vitals  /61   Pulse 89   Temp 98.2 °F (36.8 °C)   Resp 16   Ht 5' 4\" (1.626 m)   Wt 73 kg (161 lb)   SpO2 95%   BMI 27.64 kg/m²      No intake or output data in the 24 hours ending 23 0831     Current inpatient medications reviewed:  Current Facility-Administered Medications   Medication Dose Route Frequency    melatonin tablet 3 mg  3 mg Oral ONCE    dasatinib (SPRYCEL) chemo tab tab 100 mg (Patient Supplied)  100 mg Oral DAILY    doxazosin (CARDURA) tablet 2 mg  2 mg Oral QHS    LORazepam (ATIVAN) tablet 0.5 mg  0.5 mg Oral Q6H PRN    sodium chloride (NS) flush 5-40 mL  5-40 mL IntraVENous Q8H    sodium chloride (NS) flush 5-40 mL  5-40 mL IntraVENous PRN    acetaminophen (TYLENOL) tablet 650 mg  650 mg Oral Q6H PRN    Or    acetaminophen (TYLENOL) suppository 650 mg  650 mg Rectal Q6H PRN    polyethylene glycol (MIRALAX) packet 17 g  17 g Oral DAILY PRN    ondansetron (ZOFRAN ODT) tablet 4 mg  4 mg Oral Q8H PRN    Or    ondansetron (ZOFRAN) injection 4 mg  4 mg IntraVENous Q6H PRN    enoxaparin (LOVENOX) injection 40 mg  40 mg SubCUTAneous DAILY    furosemide (LASIX) injection 40 mg  40 mg IntraVENous BID    levothyroxine (SYNTHROID) tablet 112 mcg  112 mcg Oral ACB    sertraline (ZOLOFT) tablet 100 mg  100 mg Oral DAILY     Current Outpatient Medications   Medication Sig    cyanocobalamin (VITAMIN B12) 1,000 mcg/mL injection ADMINISTER 1 ML INTO MUSCLE ONCE MONTHLY AS DIRECTED    levothyroxine (SYNTHROID) 125 mcg tablet TAKE 1 TABLET BY MOUTH DAILY BEFORE AND BREAKFAST    dicyclomine (BENTYL) 10 mg capsule Take 1 Capsule by mouth daily. (Patient not taking: Reported on 5/1/2023)    LORazepam (ATIVAN) 0.5 mg tablet TAKE 1 TABLET BY MOUTH EVERY 8 HOURS AS NEEDED FOR ANXIETY. MAX DAILY AMOUNT: 1.5 MG    valACYclovir (VALTREX) 500 mg tablet TAKE 1 TABLET BY MOUTH TWICE DAILY FOR 3 DAYS. MAX 6 DOSES PER OUTBREAK (Patient not taking: Reported on 5/1/2023)    doxazosin (CARDURA) 2 mg tablet Take 1 Tablet by mouth nightly. amLODIPine (NORVASC) 5 mg tablet Take 1 Tablet by mouth daily. sertraline (ZOLOFT) 100 mg tablet Take 1 Tablet by mouth daily. oxybutynin (DITROPAN) 5 mg tablet TAKE 1 TABLET BY MOUTH TWICE DAILY FOR URINARY CONTINENCE (Patient not taking: Reported on 5/1/2023)    Insulin Syringe-Needle U-100 1 mL 31 gauge x 5/16 syrg insulin syringe U-100 with needle 1 mL 31 gauge x 5/16\"    dasatinib (SpryceL) 100 mg tab Sprycel 100 mg tablet       Physical Examination:    General:   Weak and frail  but not in any acute distress   Eyes:   pale conjunctivae, PERRLA with no discharge. ENT:   no ottorrhea or rhinorrhea with dry mucous membranes  Neck: no masses, thyroid non-tender and trachea central.  Pulm:  no accessory muscle use, decreased breath sounds with scattered crackles. No wheezes  Card:  no JVD or murmurs, has regular and normal S1, S2 without thrills, bruits. + peripheral edema  Abd:  Soft, non-tender, non-distended, normoactive bowel sounds with no palpable organomegaly  Musc:  No cyanosis, clubbing, atrophy or deformities. Skin:  No rashes but erythematous shins . Neuro: Awake and alert. Generally a non focal exam. Follows commands appropriately  Psych:  Has a fair insight to her illness, denies any suicidal ideation.      Diagnostic testing:    Laboratory data reviewed and independently interpreted:    Recent Labs 05/02/23  0411 05/01/23  1245   WBC 4.1 4.3   HGB 7.8* 10.9*   HCT 24.7* 34.0*    231     Recent Labs     05/02/23  0411 05/01/23  1245   * 127*   K 3.4* 3.6    99   CO2 24 22   GLU 82 110*   BUN 16 16   CREA 0.99 0.97   CA 8.0* 8.9   ALB  --  4.1   ALT  --  18     No components found for: GLPOC    Cultures, Imaging studies reviewed and reports noted, Telemetry reviewed and independently interpreted by me and available notes from other care providers - all reviewed by me on: 5/2/2023    Assessment and Plan:    CURRAN (dyspnea on exertion) (5/1/2023) / Pleural effusion, left (5/2/2023) / Swelling of lower extremity (5/2/2023) POA: concerning for CHF. CT chest showed a left pleural effusion. Pro-. Amlodipine discontinued. Duplex ultrasound lower extremities neg for DVT. Echocardiogram pending. Continue IV Furosemide. Cardiology following    Acquired hypothyroidism (8/12/2020) POA: recent TSH was 4.6. Continue Levothyroxine    CML (chronic myeloid leukemia) (HCC) POA: Overview: [address] is managing. Doing well on Sprycel. She says her shin erythema is associated with this. Continue Sprycel. Outpatient follow up with [facility]    Anemia (5/2/2023) POA: she had a Hgb drom since yesterday. Monitor Hgb. Check stool for occult blood    Depression with suicidal ideation (5/2/2023) POA; she denies any suicidal ideation at this time. Given this was a concern from her daughter, will ask psychiatry to review    Hypokalemia due to loss of potassium (5/2/2023) / Hyponatremia (5/2/2023) POA; likely from diuresis. Replenish K+ and follow BMP    Care Plan discussed with: Patient, Nursing, CM     Prophylaxis:  Lovenox               Anticipated Disposition:   PT, OT, RN    PCP:      Dionne Al, SILVINA     I have personally examined and treated the patient at bedside during this period.  To assist coordination of care and communication with nursing and staff, this note may be preliminary early in the day, but finalized by end of the day.         ___________________________________________________    Attending Physician:   Hakan Arriola MD

## 2023-05-03 ENCOUNTER — APPOINTMENT (OUTPATIENT)
Dept: NON INVASIVE DIAGNOSTICS | Age: 78
End: 2023-05-03
Attending: HOSPITALIST
Payer: MEDICARE

## 2023-05-03 ENCOUNTER — APPOINTMENT (OUTPATIENT)
Dept: CT IMAGING | Age: 78
End: 2023-05-03
Attending: NURSE PRACTITIONER
Payer: MEDICARE

## 2023-05-03 LAB
ANION GAP SERPL CALC-SCNC: 6 MMOL/L (ref 5–15)
ATRIAL RATE: 90 BPM
BASOPHILS # BLD: 0.1 K/UL (ref 0–0.1)
BASOPHILS NFR BLD: 1 % (ref 0–1)
BUN SERPL-MCNC: 19 MG/DL (ref 6–20)
BUN/CREAT SERPL: 16 (ref 12–20)
CALCIUM SERPL-MCNC: 8.3 MG/DL (ref 8.5–10.1)
CALCULATED P AXIS, ECG09: 54 DEGREES
CALCULATED R AXIS, ECG10: -78 DEGREES
CALCULATED T AXIS, ECG11: 52 DEGREES
CHLORIDE SERPL-SCNC: 97 MMOL/L (ref 97–108)
CO2 SERPL-SCNC: 31 MMOL/L (ref 21–32)
CREAT SERPL-MCNC: 1.2 MG/DL (ref 0.55–1.02)
DIAGNOSIS, 93000: NORMAL
DIFFERENTIAL METHOD BLD: ABNORMAL
ECHO AO ASC DIAM: 3.4 CM
ECHO AO ASCENDING AORTA INDEX: 1.97 CM/M2
ECHO AV AREA PEAK VELOCITY: 1.7 CM2
ECHO AV AREA VTI: 1.7 CM2
ECHO AV AREA/BSA PEAK VELOCITY: 1 CM2/M2
ECHO AV AREA/BSA VTI: 1 CM2/M2
ECHO AV MEAN GRADIENT: 8 MMHG
ECHO AV MEAN VELOCITY: 1.3 M/S
ECHO AV PEAK GRADIENT: 17 MMHG
ECHO AV PEAK VELOCITY: 2 M/S
ECHO AV VELOCITY RATIO: 0.65
ECHO AV VTI: 41.8 CM
ECHO LA DIAMETER INDEX: 1.68 CM/M2
ECHO LA DIAMETER: 2.9 CM
ECHO LA VOL 2C: 19 ML (ref 22–52)
ECHO LA VOL 4C: 33 ML (ref 22–52)
ECHO LA VOL BP: 27 ML (ref 22–52)
ECHO LA VOL/BSA BIPLANE: 16 ML/M2 (ref 16–34)
ECHO LA VOLUME AREA LENGTH: 29 ML
ECHO LA VOLUME INDEX A2C: 11 ML/M2 (ref 16–34)
ECHO LA VOLUME INDEX A4C: 19 ML/M2 (ref 16–34)
ECHO LA VOLUME INDEX AREA LENGTH: 17 ML/M2 (ref 16–34)
ECHO LV E' LATERAL VELOCITY: 6 CM/S
ECHO LV E' SEPTAL VELOCITY: 6 CM/S
ECHO LV EDV A2C: 78 ML
ECHO LV EDV A4C: 82 ML
ECHO LV EDV BP: 83 ML (ref 56–104)
ECHO LV EDV INDEX A4C: 47 ML/M2
ECHO LV EDV INDEX BP: 48 ML/M2
ECHO LV EDV NDEX A2C: 45 ML/M2
ECHO LV EJECTION FRACTION A2C: 73 %
ECHO LV EJECTION FRACTION A4C: 78 %
ECHO LV EJECTION FRACTION BIPLANE: 76 % (ref 55–100)
ECHO LV ESV A2C: 21 ML
ECHO LV ESV A4C: 18 ML
ECHO LV ESV BP: 20 ML (ref 19–49)
ECHO LV ESV INDEX A2C: 12 ML/M2
ECHO LV ESV INDEX A4C: 10 ML/M2
ECHO LV ESV INDEX BP: 12 ML/M2
ECHO LV FRACTIONAL SHORTENING: 30 % (ref 28–44)
ECHO LV INTERNAL DIMENSION DIASTOLE INDEX: 2.49 CM/M2
ECHO LV INTERNAL DIMENSION DIASTOLIC: 4.3 CM (ref 3.9–5.3)
ECHO LV INTERNAL DIMENSION SYSTOLIC INDEX: 1.73 CM/M2
ECHO LV INTERNAL DIMENSION SYSTOLIC: 3 CM
ECHO LV IVSD: 0.7 CM (ref 0.6–0.9)
ECHO LV MASS 2D: 88.5 G (ref 67–162)
ECHO LV MASS INDEX 2D: 51.2 G/M2 (ref 43–95)
ECHO LV POSTERIOR WALL DIASTOLIC: 0.7 CM (ref 0.6–0.9)
ECHO LV RELATIVE WALL THICKNESS RATIO: 0.33
ECHO LVOT AREA: 2.5 CM2
ECHO LVOT AV VTI INDEX: 0.64
ECHO LVOT DIAM: 1.8 CM
ECHO LVOT MEAN GRADIENT: 4 MMHG
ECHO LVOT PEAK GRADIENT: 7 MMHG
ECHO LVOT PEAK VELOCITY: 1.3 M/S
ECHO LVOT STROKE VOLUME INDEX: 39.4 ML/M2
ECHO LVOT SV: 68.2 ML
ECHO LVOT VTI: 26.8 CM
ECHO MV A VELOCITY: 1.08 M/S
ECHO MV E DECELERATION TIME (DT): 237.4 MS
ECHO MV E VELOCITY: 0.91 M/S
ECHO MV E/A RATIO: 0.84
ECHO MV E/E' LATERAL: 15.17
ECHO MV E/E' RATIO (AVERAGED): 15.17
ECHO MV E/E' SEPTAL: 15.17
ECHO PV MAX VELOCITY: 1.2 M/S
ECHO PV PEAK GRADIENT: 6 MMHG
ECHO RV FREE WALL PEAK S': 12 CM/S
ECHO RV INTERNAL DIMENSION: 4.4 CM
ECHO RV TAPSE: 2.4 CM (ref 1.7–?)
ECHO TV REGURGITANT MAX VELOCITY: 2.18 M/S
ECHO TV REGURGITANT PEAK GRADIENT: 19 MMHG
EOSINOPHIL # BLD: 0.1 K/UL (ref 0–0.4)
EOSINOPHIL NFR BLD: 2 % (ref 0–7)
ERYTHROCYTE [DISTWIDTH] IN BLOOD BY AUTOMATED COUNT: 14.1 % (ref 11.5–14.5)
GLUCOSE SERPL-MCNC: 84 MG/DL (ref 65–100)
HCT VFR BLD AUTO: 31 % (ref 35–47)
HGB BLD-MCNC: 9.9 G/DL (ref 11.5–16)
IMM GRANULOCYTES # BLD AUTO: 0 K/UL (ref 0–0.04)
IMM GRANULOCYTES NFR BLD AUTO: 0 % (ref 0–0.5)
LYMPHOCYTES # BLD: 0.7 K/UL (ref 0.8–3.5)
LYMPHOCYTES NFR BLD: 13 % (ref 12–49)
MAGNESIUM SERPL-MCNC: 1.8 MG/DL (ref 1.6–2.4)
MCH RBC QN AUTO: 25.4 PG (ref 26–34)
MCHC RBC AUTO-ENTMCNC: 31.9 G/DL (ref 30–36.5)
MCV RBC AUTO: 79.7 FL (ref 80–99)
MONOCYTES # BLD: 0.9 K/UL (ref 0–1)
MONOCYTES NFR BLD: 17 % (ref 5–13)
NEUTS SEG # BLD: 3.4 K/UL (ref 1.8–8)
NEUTS SEG NFR BLD: 66 % (ref 32–75)
NRBC # BLD: 0 K/UL (ref 0–0.01)
NRBC BLD-RTO: 0 PER 100 WBC
P-R INTERVAL, ECG05: 254 MS
PLATELET # BLD AUTO: 268 K/UL (ref 150–400)
PMV BLD AUTO: 9.5 FL (ref 8.9–12.9)
POTASSIUM SERPL-SCNC: 2.8 MMOL/L (ref 3.5–5.1)
POTASSIUM SERPL-SCNC: 3.7 MMOL/L (ref 3.5–5.1)
Q-T INTERVAL, ECG07: 394 MS
QRS DURATION, ECG06: 128 MS
QTC CALCULATION (BEZET), ECG08: 481 MS
RBC # BLD AUTO: 3.89 M/UL (ref 3.8–5.2)
SODIUM SERPL-SCNC: 134 MMOL/L (ref 136–145)
VENTRICULAR RATE, ECG03: 90 BPM
WBC # BLD AUTO: 5.1 K/UL (ref 3.6–11)

## 2023-05-03 PROCEDURE — 36415 COLL VENOUS BLD VENIPUNCTURE: CPT

## 2023-05-03 PROCEDURE — 71260 CT THORAX DX C+: CPT

## 2023-05-03 PROCEDURE — 65270000046 HC RM TELEMETRY

## 2023-05-03 PROCEDURE — 74011000636 HC RX REV CODE- 636: Performed by: INTERNAL MEDICINE

## 2023-05-03 PROCEDURE — 83735 ASSAY OF MAGNESIUM: CPT

## 2023-05-03 PROCEDURE — 80048 BASIC METABOLIC PNL TOTAL CA: CPT

## 2023-05-03 PROCEDURE — 74011000250 HC RX REV CODE- 250: Performed by: HOSPITALIST

## 2023-05-03 PROCEDURE — 74011250637 HC RX REV CODE- 250/637: Performed by: HOSPITALIST

## 2023-05-03 PROCEDURE — 74011250636 HC RX REV CODE- 250/636: Performed by: HOSPITALIST

## 2023-05-03 PROCEDURE — 85025 COMPLETE CBC W/AUTO DIFF WBC: CPT

## 2023-05-03 PROCEDURE — 74011250637 HC RX REV CODE- 250/637: Performed by: INTERNAL MEDICINE

## 2023-05-03 PROCEDURE — 84132 ASSAY OF SERUM POTASSIUM: CPT

## 2023-05-03 PROCEDURE — 93306 TTE W/DOPPLER COMPLETE: CPT | Performed by: INTERNAL MEDICINE

## 2023-05-03 PROCEDURE — 93306 TTE W/DOPPLER COMPLETE: CPT

## 2023-05-03 RX ORDER — FUROSEMIDE 40 MG/1
40 TABLET ORAL DAILY
Qty: 30 TABLET | Refills: 0 | Status: SHIPPED | OUTPATIENT
Start: 2023-05-03 | End: 2023-06-02

## 2023-05-03 RX ORDER — FUROSEMIDE 40 MG/1
40 TABLET ORAL DAILY
Status: DISCONTINUED | OUTPATIENT
Start: 2023-05-04 | End: 2023-05-04

## 2023-05-03 RX ORDER — POTASSIUM CHLORIDE 20 MEQ/1
20 TABLET, EXTENDED RELEASE ORAL 2 TIMES DAILY
Qty: 30 TABLET | Refills: 0 | Status: SHIPPED | OUTPATIENT
Start: 2023-05-03 | End: 2023-05-18

## 2023-05-03 RX ORDER — POTASSIUM CHLORIDE 750 MG/1
40 TABLET, FILM COATED, EXTENDED RELEASE ORAL ONCE
Status: COMPLETED | OUTPATIENT
Start: 2023-05-03 | End: 2023-05-03

## 2023-05-03 RX ADMIN — POTASSIUM BICARBONATE 40 MEQ: 782 TABLET, EFFERVESCENT ORAL at 11:43

## 2023-05-03 RX ADMIN — DOXAZOSIN 2 MG: 2 TABLET ORAL at 21:23

## 2023-05-03 RX ADMIN — Medication 10 ML: at 05:38

## 2023-05-03 RX ADMIN — SERTRALINE 100 MG: 50 TABLET, FILM COATED ORAL at 08:29

## 2023-05-03 RX ADMIN — ENOXAPARIN SODIUM 40 MG: 100 INJECTION SUBCUTANEOUS at 08:29

## 2023-05-03 RX ADMIN — IOPAMIDOL 100 ML: 755 INJECTION, SOLUTION INTRAVENOUS at 14:15

## 2023-05-03 RX ADMIN — FUROSEMIDE 40 MG: 10 INJECTION, SOLUTION INTRAMUSCULAR; INTRAVENOUS at 08:29

## 2023-05-03 RX ADMIN — ONDANSETRON 4 MG: 2 INJECTION INTRAMUSCULAR; INTRAVENOUS at 21:53

## 2023-05-03 RX ADMIN — LEVOTHYROXINE SODIUM 112 MCG: 0.11 TABLET ORAL at 05:35

## 2023-05-03 RX ADMIN — POTASSIUM CHLORIDE 40 MEQ: 750 TABLET, FILM COATED, EXTENDED RELEASE ORAL at 21:22

## 2023-05-03 RX ADMIN — POTASSIUM BICARBONATE 40 MEQ: 782 TABLET, EFFERVESCENT ORAL at 14:54

## 2023-05-03 RX ADMIN — Medication 5 ML: at 21:22

## 2023-05-04 ENCOUNTER — APPOINTMENT (OUTPATIENT)
Dept: NON INVASIVE DIAGNOSTICS | Age: 78
End: 2023-05-04
Attending: NURSE PRACTITIONER
Payer: MEDICARE

## 2023-05-04 PROBLEM — N18.30 CHRONIC KIDNEY DISEASE, STAGE 3 (HCC): Status: RESOLVED | Noted: 2020-08-12 | Resolved: 2023-05-04

## 2023-05-04 PROBLEM — D51.0 PERNICIOUS ANEMIA: Status: RESOLVED | Noted: 2020-08-12 | Resolved: 2023-05-04

## 2023-05-04 PROBLEM — L21.9 SEBORRHEIC DERMATITIS OF SCALP: Status: RESOLVED | Noted: 2020-08-12 | Resolved: 2023-05-04

## 2023-05-04 PROBLEM — R01.1 HEART MURMUR: Status: RESOLVED | Noted: 2020-08-12 | Resolved: 2023-05-04

## 2023-05-04 PROBLEM — B37.2 CANDIDIASIS OF SKIN: Status: RESOLVED | Noted: 2020-08-12 | Resolved: 2023-05-04

## 2023-05-04 PROBLEM — E66.811 CLASS 1 OBESITY DUE TO EXCESS CALORIES WITH SERIOUS COMORBIDITY AND BODY MASS INDEX (BMI) OF 31.0 TO 31.9 IN ADULT: Status: RESOLVED | Noted: 2020-08-12 | Resolved: 2023-05-04

## 2023-05-04 PROBLEM — E66.09 CLASS 1 OBESITY DUE TO EXCESS CALORIES WITH SERIOUS COMORBIDITY AND BODY MASS INDEX (BMI) OF 31.0 TO 31.9 IN ADULT: Status: RESOLVED | Noted: 2020-08-12 | Resolved: 2023-05-04

## 2023-05-04 PROBLEM — K29.40 ATROPHIC GASTRITIS: Status: RESOLVED | Noted: 2020-08-12 | Resolved: 2023-05-04

## 2023-05-04 PROBLEM — B00.1 HERPES LABIALIS: Status: RESOLVED | Noted: 2020-08-12 | Resolved: 2023-05-04

## 2023-05-04 PROBLEM — I50.32 CHF (CONGESTIVE HEART FAILURE), NYHA CLASS I, CHRONIC, DIASTOLIC (HCC): Status: ACTIVE | Noted: 2023-05-04

## 2023-05-04 PROBLEM — A60.00 GENITAL HERPES SIMPLEX: Status: RESOLVED | Noted: 2020-08-12 | Resolved: 2023-05-04

## 2023-05-04 PROBLEM — I10 HYPERTENSION: Status: ACTIVE | Noted: 2020-08-12

## 2023-05-04 PROBLEM — E53.8 COBALAMIN DEFICIENCY: Status: RESOLVED | Noted: 2020-08-12 | Resolved: 2023-05-04

## 2023-05-04 LAB
ANION GAP SERPL CALC-SCNC: 1 MMOL/L (ref 5–15)
B PERT DNA SPEC QL NAA+PROBE: NOT DETECTED
BASOPHILS # BLD: 0.1 K/UL (ref 0–0.1)
BASOPHILS NFR BLD: 2 % (ref 0–1)
BORDETELLA PARAPERTUSSIS PCR, BORPAR: NOT DETECTED
BUN SERPL-MCNC: 19 MG/DL (ref 6–20)
BUN/CREAT SERPL: 15 (ref 12–20)
C PNEUM DNA SPEC QL NAA+PROBE: NOT DETECTED
CALCIUM SERPL-MCNC: 8.5 MG/DL (ref 8.5–10.1)
CHLORIDE SERPL-SCNC: 97 MMOL/L (ref 97–108)
CO2 SERPL-SCNC: 33 MMOL/L (ref 21–32)
COMMENT, HOLDF: NORMAL
CREAT SERPL-MCNC: 1.25 MG/DL (ref 0.55–1.02)
D DIMER PPP FEU-MCNC: 0.74 MG/L FEU (ref 0–0.65)
DIFFERENTIAL METHOD BLD: ABNORMAL
EOSINOPHIL # BLD: 0.1 K/UL (ref 0–0.4)
EOSINOPHIL NFR BLD: 3 % (ref 0–7)
ERYTHROCYTE [DISTWIDTH] IN BLOOD BY AUTOMATED COUNT: 14.2 % (ref 11.5–14.5)
FERRITIN SERPL-MCNC: 21 NG/ML (ref 8–252)
FLUAV SUBTYP SPEC NAA+PROBE: NOT DETECTED
FLUBV RNA SPEC QL NAA+PROBE: NOT DETECTED
FOLATE SERPL-MCNC: 7.5 NG/ML (ref 5–21)
GLUCOSE SERPL-MCNC: 108 MG/DL (ref 65–100)
HADV DNA SPEC QL NAA+PROBE: NOT DETECTED
HAPTOGLOB SERPL-MCNC: 119 MG/DL (ref 30–200)
HCOV 229E RNA SPEC QL NAA+PROBE: NOT DETECTED
HCOV HKU1 RNA SPEC QL NAA+PROBE: NOT DETECTED
HCOV NL63 RNA SPEC QL NAA+PROBE: NOT DETECTED
HCOV OC43 RNA SPEC QL NAA+PROBE: NOT DETECTED
HCT VFR BLD AUTO: 30.7 % (ref 35–47)
HGB BLD-MCNC: 9.8 G/DL (ref 11.5–16)
HMPV RNA SPEC QL NAA+PROBE: NOT DETECTED
HPIV1 RNA SPEC QL NAA+PROBE: NOT DETECTED
HPIV2 RNA SPEC QL NAA+PROBE: NOT DETECTED
HPIV3 RNA SPEC QL NAA+PROBE: NOT DETECTED
HPIV4 RNA SPEC QL NAA+PROBE: NOT DETECTED
IMM GRANULOCYTES # BLD AUTO: 0 K/UL (ref 0–0.04)
IMM GRANULOCYTES NFR BLD AUTO: 0 % (ref 0–0.5)
IRON SATN MFR SERPL: 10 % (ref 20–50)
IRON SERPL-MCNC: 30 UG/DL (ref 35–150)
LDH SERPL L TO P-CCNC: 215 U/L (ref 81–246)
LYMPHOCYTES # BLD: 1.1 K/UL (ref 0.8–3.5)
LYMPHOCYTES NFR BLD: 25 % (ref 12–49)
M PNEUMO DNA SPEC QL NAA+PROBE: NOT DETECTED
MCH RBC QN AUTO: 25.7 PG (ref 26–34)
MCHC RBC AUTO-ENTMCNC: 31.9 G/DL (ref 30–36.5)
MCV RBC AUTO: 80.6 FL (ref 80–99)
MONOCYTES # BLD: 0.8 K/UL (ref 0–1)
MONOCYTES NFR BLD: 17 % (ref 5–13)
NEUTS SEG # BLD: 2.5 K/UL (ref 1.8–8)
NEUTS SEG NFR BLD: 53 % (ref 32–75)
NRBC # BLD: 0 K/UL (ref 0–0.01)
NRBC BLD-RTO: 0 PER 100 WBC
PLATELET # BLD AUTO: 243 K/UL (ref 150–400)
PMV BLD AUTO: 9.3 FL (ref 8.9–12.9)
POTASSIUM SERPL-SCNC: 3.6 MMOL/L (ref 3.5–5.1)
RBC # BLD AUTO: 3.81 M/UL (ref 3.8–5.2)
RETICS # AUTO: 0.03 M/UL (ref 0.02–0.08)
RETICS/RBC NFR AUTO: 0.9 % (ref 0.7–2.1)
RSV RNA SPEC QL NAA+PROBE: NOT DETECTED
RV+EV RNA SPEC QL NAA+PROBE: NOT DETECTED
SAMPLES BEING HELD,HOLD: NORMAL
SARS-COV-2 RDRP RESP QL NAA+PROBE: NOT DETECTED
SARS-COV-2 RNA RESP QL NAA+PROBE: NOT DETECTED
SODIUM SERPL-SCNC: 131 MMOL/L (ref 136–145)
SOURCE, COVRS: NORMAL
TIBC SERPL-MCNC: 306 UG/DL (ref 250–450)
TSH SERPL DL<=0.05 MIU/L-ACNC: 7.03 UIU/ML (ref 0.36–3.74)
VIT B12 SERPL-MCNC: >2000 PG/ML (ref 193–986)
WBC # BLD AUTO: 4.6 K/UL (ref 3.6–11)

## 2023-05-04 PROCEDURE — 87635 SARS-COV-2 COVID-19 AMP PRB: CPT

## 2023-05-04 PROCEDURE — 82746 ASSAY OF FOLIC ACID SERUM: CPT

## 2023-05-04 PROCEDURE — 85379 FIBRIN DEGRADATION QUANT: CPT

## 2023-05-04 PROCEDURE — 97161 PT EVAL LOW COMPLEX 20 MIN: CPT

## 2023-05-04 PROCEDURE — 80048 BASIC METABOLIC PNL TOTAL CA: CPT

## 2023-05-04 PROCEDURE — 84443 ASSAY THYROID STIM HORMONE: CPT

## 2023-05-04 PROCEDURE — 0202U NFCT DS 22 TRGT SARS-COV-2: CPT

## 2023-05-04 PROCEDURE — 74011000250 HC RX REV CODE- 250: Performed by: HOSPITALIST

## 2023-05-04 PROCEDURE — 74011250637 HC RX REV CODE- 250/637: Performed by: NURSE PRACTITIONER

## 2023-05-04 PROCEDURE — 82607 VITAMIN B-12: CPT

## 2023-05-04 PROCEDURE — 82728 ASSAY OF FERRITIN: CPT

## 2023-05-04 PROCEDURE — 97116 GAIT TRAINING THERAPY: CPT

## 2023-05-04 PROCEDURE — 94618 PULMONARY STRESS TESTING: CPT

## 2023-05-04 PROCEDURE — 96374 THER/PROPH/DIAG INJ IV PUSH: CPT

## 2023-05-04 PROCEDURE — 85045 AUTOMATED RETICULOCYTE COUNT: CPT

## 2023-05-04 PROCEDURE — 99232 SBSQ HOSP IP/OBS MODERATE 35: CPT | Performed by: SPECIALIST

## 2023-05-04 PROCEDURE — 93325 DOPPLER ECHO COLOR FLOW MAPG: CPT | Performed by: SPECIALIST

## 2023-05-04 PROCEDURE — 83010 ASSAY OF HAPTOGLOBIN QUANT: CPT

## 2023-05-04 PROCEDURE — 93312 ECHO TRANSESOPHAGEAL: CPT | Performed by: SPECIALIST

## 2023-05-04 PROCEDURE — 36415 COLL VENOUS BLD VENIPUNCTURE: CPT

## 2023-05-04 PROCEDURE — 99152 MOD SED SAME PHYS/QHP 5/>YRS: CPT

## 2023-05-04 PROCEDURE — 65270000046 HC RM TELEMETRY

## 2023-05-04 PROCEDURE — 83540 ASSAY OF IRON: CPT

## 2023-05-04 PROCEDURE — 74011250636 HC RX REV CODE- 250/636: Performed by: SPECIALIST

## 2023-05-04 PROCEDURE — APPSS30 APP SPLIT SHARED TIME 16-30 MINUTES: Performed by: NURSE PRACTITIONER

## 2023-05-04 PROCEDURE — 74011250636 HC RX REV CODE- 250/636: Performed by: HOSPITALIST

## 2023-05-04 PROCEDURE — 74011250637 HC RX REV CODE- 250/637: Performed by: HOSPITALIST

## 2023-05-04 PROCEDURE — 74011000250 HC RX REV CODE- 250: Performed by: SPECIALIST

## 2023-05-04 PROCEDURE — 83615 LACTATE (LD) (LDH) ENZYME: CPT

## 2023-05-04 PROCEDURE — B24BZZ4 ULTRASONOGRAPHY OF HEART WITH AORTA, TRANSESOPHAGEAL: ICD-10-PCS | Performed by: SPECIALIST

## 2023-05-04 PROCEDURE — 85025 COMPLETE CBC W/AUTO DIFF WBC: CPT

## 2023-05-04 RX ORDER — LIDOCAINE HYDROCHLORIDE 20 MG/ML
15 SOLUTION OROPHARYNGEAL
Status: DISCONTINUED | OUTPATIENT
Start: 2023-05-04 | End: 2023-05-04 | Stop reason: ALTCHOICE

## 2023-05-04 RX ORDER — LIDOCAINE HYDROCHLORIDE 20 MG/ML
JELLY TOPICAL
Status: COMPLETED | OUTPATIENT
Start: 2023-05-04 | End: 2023-05-04

## 2023-05-04 RX ORDER — MIDAZOLAM HYDROCHLORIDE 1 MG/ML
.5-1 INJECTION, SOLUTION INTRAMUSCULAR; INTRAVENOUS
Status: DISCONTINUED | OUTPATIENT
Start: 2023-05-04 | End: 2023-05-04 | Stop reason: ALTCHOICE

## 2023-05-04 RX ORDER — SODIUM CHLORIDE 9 MG/ML
10 INJECTION INTRAMUSCULAR; INTRAVENOUS; SUBCUTANEOUS
Status: DISCONTINUED | OUTPATIENT
Start: 2023-05-04 | End: 2023-05-04 | Stop reason: ALTCHOICE

## 2023-05-04 RX ORDER — FENTANYL CITRATE 50 UG/ML
25-200 INJECTION, SOLUTION INTRAMUSCULAR; INTRAVENOUS
Status: DISCONTINUED | OUTPATIENT
Start: 2023-05-04 | End: 2023-05-04 | Stop reason: ALTCHOICE

## 2023-05-04 RX ORDER — GUAIFENESIN 100 MG/5ML
162 LIQUID (ML) ORAL DAILY
Qty: 60 TABLET | Refills: 2 | Status: SHIPPED
Start: 2023-05-04 | End: 2023-08-02

## 2023-05-04 RX ORDER — LIDOCAINE 50 MG/G
OINTMENT TOPICAL
Status: DISCONTINUED | OUTPATIENT
Start: 2023-05-04 | End: 2023-05-04 | Stop reason: ALTCHOICE

## 2023-05-04 RX ORDER — GUAIFENESIN 100 MG/5ML
162 LIQUID (ML) ORAL DAILY
Status: DISCONTINUED | OUTPATIENT
Start: 2023-05-04 | End: 2023-05-05 | Stop reason: HOSPADM

## 2023-05-04 RX ADMIN — Medication: at 11:04

## 2023-05-04 RX ADMIN — LIDOCAINE HYDROCHLORIDE 15 ML: 20 SOLUTION ORAL at 10:47

## 2023-05-04 RX ADMIN — MIDAZOLAM 2 MG: 1 INJECTION INTRAMUSCULAR; INTRAVENOUS at 11:33

## 2023-05-04 RX ADMIN — LIDOCAINE HYDROCHLORIDE: 20 JELLY TOPICAL at 11:39

## 2023-05-04 RX ADMIN — Medication 5 ML: at 06:23

## 2023-05-04 RX ADMIN — MIDAZOLAM 1 MG: 1 INJECTION INTRAMUSCULAR; INTRAVENOUS at 11:35

## 2023-05-04 RX ADMIN — FENTANYL CITRATE 25 MCG: 50 INJECTION, SOLUTION INTRAMUSCULAR; INTRAVENOUS at 11:35

## 2023-05-04 RX ADMIN — SODIUM CHLORIDE 10 ML: 9 INJECTION INTRAMUSCULAR; INTRAVENOUS; SUBCUTANEOUS at 11:52

## 2023-05-04 RX ADMIN — SERTRALINE 100 MG: 50 TABLET, FILM COATED ORAL at 09:11

## 2023-05-04 RX ADMIN — FENTANYL CITRATE 25 MCG: 50 INJECTION, SOLUTION INTRAMUSCULAR; INTRAVENOUS at 11:33

## 2023-05-04 RX ADMIN — ASPIRIN 162 MG: 81 TABLET, CHEWABLE ORAL at 14:58

## 2023-05-04 RX ADMIN — DOXAZOSIN 2 MG: 2 TABLET ORAL at 21:46

## 2023-05-04 RX ADMIN — LEVOTHYROXINE SODIUM 112 MCG: 0.11 TABLET ORAL at 06:24

## 2023-05-04 RX ADMIN — ENOXAPARIN SODIUM 40 MG: 100 INJECTION SUBCUTANEOUS at 09:10

## 2023-05-04 RX ADMIN — Medication 5 ML: at 21:46

## 2023-05-05 ENCOUNTER — APPOINTMENT (OUTPATIENT)
Dept: GENERAL RADIOLOGY | Age: 78
End: 2023-05-05
Attending: RADIOLOGY
Payer: MEDICARE

## 2023-05-05 ENCOUNTER — APPOINTMENT (OUTPATIENT)
Dept: ULTRASOUND IMAGING | Age: 78
End: 2023-05-05
Attending: INTERNAL MEDICINE
Payer: MEDICARE

## 2023-05-05 ENCOUNTER — TELEPHONE (OUTPATIENT)
Dept: FAMILY MEDICINE CLINIC | Age: 78
End: 2023-05-05

## 2023-05-05 VITALS
WEIGHT: 148.81 LBS | TEMPERATURE: 97.8 F | HEART RATE: 65 BPM | SYSTOLIC BLOOD PRESSURE: 119 MMHG | RESPIRATION RATE: 16 BRPM | OXYGEN SATURATION: 98 % | DIASTOLIC BLOOD PRESSURE: 61 MMHG | HEIGHT: 64 IN | BODY MASS INDEX: 25.41 KG/M2

## 2023-05-05 LAB
ANION GAP SERPL CALC-SCNC: 4 MMOL/L (ref 5–15)
APPEARANCE FLD: ABNORMAL
BASOPHILS # BLD: 0.1 K/UL (ref 0–0.1)
BASOPHILS NFR BLD: 2 % (ref 0–1)
BODY FLD TYPE: NORMAL
BUN SERPL-MCNC: 19 MG/DL (ref 6–20)
BUN/CREAT SERPL: 16 (ref 12–20)
CALCIUM SERPL-MCNC: 7.9 MG/DL (ref 8.5–10.1)
CHLORIDE SERPL-SCNC: 97 MMOL/L (ref 97–108)
CO2 SERPL-SCNC: 32 MMOL/L (ref 21–32)
COLOR FLD: YELLOW
CREAT SERPL-MCNC: 1.19 MG/DL (ref 0.55–1.02)
DIFFERENTIAL METHOD BLD: ABNORMAL
EOSINOPHIL # BLD: 0.2 K/UL (ref 0–0.4)
EOSINOPHIL NFR BLD: 5 % (ref 0–7)
ERYTHROCYTE [DISTWIDTH] IN BLOOD BY AUTOMATED COUNT: 14.2 % (ref 11.5–14.5)
GLUCOSE FLD-MCNC: 112 MG/DL
GLUCOSE SERPL-MCNC: 97 MG/DL (ref 65–100)
HCT VFR BLD AUTO: 30.1 % (ref 35–47)
HGB BLD-MCNC: 9.4 G/DL (ref 11.5–16)
IMM GRANULOCYTES # BLD AUTO: 0 K/UL (ref 0–0.04)
IMM GRANULOCYTES NFR BLD AUTO: 0 % (ref 0–0.5)
LDH FLD L TO P-CCNC: 138 U/L
LYMPHOCYTES # BLD: 0.9 K/UL (ref 0.8–3.5)
LYMPHOCYTES NFR BLD: 19 % (ref 12–49)
LYMPHOCYTES NFR FLD: 64 %
MCH RBC QN AUTO: 25.8 PG (ref 26–34)
MCHC RBC AUTO-ENTMCNC: 31.2 G/DL (ref 30–36.5)
MCV RBC AUTO: 82.7 FL (ref 80–99)
MESOTHL CELL NFR FLD: 1 %
MONOCYTES # BLD: 0.8 K/UL (ref 0–1)
MONOCYTES NFR BLD: 17 % (ref 5–13)
MONOS+MACROS NFR FLD: 30 %
NEUTROPHILS NFR FLD: 5 %
NEUTS SEG # BLD: 2.6 K/UL (ref 1.8–8)
NEUTS SEG NFR BLD: 57 % (ref 32–75)
NRBC # BLD: 0 K/UL (ref 0–0.01)
NRBC BLD-RTO: 0 PER 100 WBC
NUC CELL # FLD: 639 /CU MM
PH FLD: 6.8
PLATELET # BLD AUTO: 234 K/UL (ref 150–400)
PMV BLD AUTO: 9.3 FL (ref 8.9–12.9)
POTASSIUM SERPL-SCNC: 3.9 MMOL/L (ref 3.5–5.1)
PROT FLD-MCNC: 4.3 G/DL
RBC # BLD AUTO: 3.64 M/UL (ref 3.8–5.2)
RBC # FLD: >100 /CU MM
SODIUM SERPL-SCNC: 133 MMOL/L (ref 136–145)
SPECIMEN SOURCE FLD: ABNORMAL
SPECIMEN SOURCE FLD: NORMAL
WBC # BLD AUTO: 4.6 K/UL (ref 3.6–11)

## 2023-05-05 PROCEDURE — 71045 X-RAY EXAM CHEST 1 VIEW: CPT

## 2023-05-05 PROCEDURE — 80048 BASIC METABOLIC PNL TOTAL CA: CPT

## 2023-05-05 PROCEDURE — 84157 ASSAY OF PROTEIN OTHER: CPT

## 2023-05-05 PROCEDURE — 82945 GLUCOSE OTHER FLUID: CPT

## 2023-05-05 PROCEDURE — 74011250637 HC RX REV CODE- 250/637: Performed by: NURSE PRACTITIONER

## 2023-05-05 PROCEDURE — 36415 COLL VENOUS BLD VENIPUNCTURE: CPT

## 2023-05-05 PROCEDURE — 87205 SMEAR GRAM STAIN: CPT

## 2023-05-05 PROCEDURE — 85025 COMPLETE CBC W/AUTO DIFF WBC: CPT

## 2023-05-05 PROCEDURE — 83986 ASSAY PH BODY FLUID NOS: CPT

## 2023-05-05 PROCEDURE — 83615 LACTATE (LD) (LDH) ENZYME: CPT

## 2023-05-05 PROCEDURE — 74011250636 HC RX REV CODE- 250/636: Performed by: HOSPITALIST

## 2023-05-05 PROCEDURE — 32555 ASPIRATE PLEURA W/ IMAGING: CPT

## 2023-05-05 PROCEDURE — 77030018870 HC TY PARCNT BD -B

## 2023-05-05 PROCEDURE — 74011000250 HC RX REV CODE- 250: Performed by: HOSPITALIST

## 2023-05-05 PROCEDURE — 74011250637 HC RX REV CODE- 250/637: Performed by: HOSPITALIST

## 2023-05-05 PROCEDURE — 89050 BODY FLUID CELL COUNT: CPT

## 2023-05-05 PROCEDURE — C1729 CATH, DRAINAGE: HCPCS

## 2023-05-05 RX ORDER — LIDOCAINE HYDROCHLORIDE 10 MG/ML
10 INJECTION, SOLUTION EPIDURAL; INFILTRATION; INTRACAUDAL; PERINEURAL
Status: COMPLETED | OUTPATIENT
Start: 2023-05-05 | End: 2023-05-05

## 2023-05-05 RX ADMIN — ENOXAPARIN SODIUM 40 MG: 100 INJECTION SUBCUTANEOUS at 08:45

## 2023-05-05 RX ADMIN — LIDOCAINE HYDROCHLORIDE 10 ML: 10 INJECTION, SOLUTION EPIDURAL; INFILTRATION; INTRACAUDAL; PERINEURAL at 11:28

## 2023-05-05 RX ADMIN — LEVOTHYROXINE SODIUM 112 MCG: 0.11 TABLET ORAL at 05:36

## 2023-05-05 RX ADMIN — Medication 5 ML: at 05:36

## 2023-05-05 RX ADMIN — ASPIRIN 162 MG: 81 TABLET, CHEWABLE ORAL at 08:45

## 2023-05-05 RX ADMIN — SERTRALINE 100 MG: 50 TABLET, FILM COATED ORAL at 08:45

## 2023-05-06 LAB
BACTERIA SPEC CULT: NORMAL
GRAM STN SPEC: NORMAL
GRAM STN SPEC: NORMAL
SERVICE CMNT-IMP: NORMAL

## 2023-05-08 ENCOUNTER — TELEPHONE (OUTPATIENT)
Facility: CLINIC | Age: 78
End: 2023-05-08

## 2023-05-08 NOTE — TELEPHONE ENCOUNTER
----- Message from Davonte Plump sent at 5/8/2023  8:40 AM EDT -----  Subject: Hospital Follow Up    QUESTIONS  What hospital was the Patient Discharged from? [facility]  Date of Discharge? 2023-05-05  Discharge Location? Home  Reason for hospitalization as patient stated? Patient went in for   shortness of Breath, pulled 750ML out of left lung, patient went home on   oxygen. Referred to [address] [place] [address], still has not been contacted as of   yet. Prefers to see Dr. Peggy Estrella. What question does the patient have, if applicable?   ---------------------------------------------------------------------------  --------------  CALL BACK INFO  What is the best way for the office to contact you? OK to leave message on   voicemail  Preferred Call Back Phone Number? [phone]  ---------------------------------------------------------------------------  --------------  SCRIPT ANSWERS  Relationship to Patient? Other/Third Party  Representative Name? Haile Craven  Additional information verified (besides Name and Date of Birth)?  Phone   Number

## 2023-05-08 NOTE — TELEPHONE ENCOUNTER
Spoke to patient she stated she's not coming back to this practice She will find another provider at different location Stated her daughter had called the call center

## 2023-05-09 LAB
BACTERIA SPEC CULT: NORMAL
GRAM STN SPEC: NORMAL
GRAM STN SPEC: NORMAL
SERVICE CMNT-IMP: NORMAL

## 2023-05-16 ENCOUNTER — TELEPHONE (OUTPATIENT)
Facility: CLINIC | Age: 78
End: 2023-05-16

## 2023-05-25 ENCOUNTER — OFFICE VISIT (OUTPATIENT)
Age: 78
End: 2023-05-25
Payer: MEDICARE

## 2023-05-25 ENCOUNTER — HOSPITAL ENCOUNTER (OUTPATIENT)
Facility: HOSPITAL | Age: 78
Discharge: HOME | End: 2023-05-25
Payer: MEDICARE

## 2023-05-25 VITALS
BODY MASS INDEX: 25.54 KG/M2 | OXYGEN SATURATION: 99 % | HEIGHT: 64 IN | SYSTOLIC BLOOD PRESSURE: 136 MMHG | HEART RATE: 72 BPM | DIASTOLIC BLOOD PRESSURE: 68 MMHG

## 2023-05-25 DIAGNOSIS — J90 PLEURAL EFFUSION, LEFT: ICD-10-CM

## 2023-05-25 DIAGNOSIS — C92.10 CML (CHRONIC MYELOID LEUKEMIA) (HCC): ICD-10-CM

## 2023-05-25 DIAGNOSIS — I10 HYPERTENSION, UNSPECIFIED TYPE: ICD-10-CM

## 2023-05-25 DIAGNOSIS — I50.32 CHF (CONGESTIVE HEART FAILURE), NYHA CLASS I, CHRONIC, DIASTOLIC (HCC): Primary | ICD-10-CM

## 2023-05-25 DIAGNOSIS — R06.09 DOE (DYSPNEA ON EXERTION): ICD-10-CM

## 2023-05-25 DIAGNOSIS — I50.32 CHF (CONGESTIVE HEART FAILURE), NYHA CLASS I, CHRONIC, DIASTOLIC (HCC): ICD-10-CM

## 2023-05-25 DIAGNOSIS — M79.89 SWELLING OF LOWER EXTREMITY: ICD-10-CM

## 2023-05-25 PROCEDURE — 71046 X-RAY EXAM CHEST 2 VIEWS: CPT

## 2023-05-25 PROCEDURE — 1036F TOBACCO NON-USER: CPT | Performed by: SPECIALIST

## 2023-05-25 PROCEDURE — G8427 DOCREV CUR MEDS BY ELIG CLIN: HCPCS | Performed by: SPECIALIST

## 2023-05-25 PROCEDURE — 99214 OFFICE O/P EST MOD 30 MIN: CPT | Performed by: SPECIALIST

## 2023-05-25 PROCEDURE — 1123F ACP DISCUSS/DSCN MKR DOCD: CPT | Performed by: SPECIALIST

## 2023-05-25 PROCEDURE — 3075F SYST BP GE 130 - 139MM HG: CPT | Performed by: SPECIALIST

## 2023-05-25 PROCEDURE — 3078F DIAST BP <80 MM HG: CPT | Performed by: SPECIALIST

## 2023-05-25 PROCEDURE — G8419 CALC BMI OUT NRM PARAM NOF/U: HCPCS | Performed by: SPECIALIST

## 2023-05-25 PROCEDURE — G8399 PT W/DXA RESULTS DOCUMENT: HCPCS | Performed by: SPECIALIST

## 2023-05-25 PROCEDURE — 1090F PRES/ABSN URINE INCON ASSESS: CPT | Performed by: SPECIALIST

## 2023-05-25 RX ORDER — ASPIRIN 81 MG/1
81 TABLET ORAL DAILY
Qty: 90 TABLET | Refills: 1
Start: 2023-05-25

## 2023-05-25 RX ORDER — FUROSEMIDE 40 MG/1
40 TABLET ORAL DAILY
Qty: 30 TABLET | Refills: 0 | COMMUNITY
Start: 2023-05-03 | End: 2023-06-02

## 2023-05-25 ASSESSMENT — PATIENT HEALTH QUESTIONNAIRE - PHQ9
SUM OF ALL RESPONSES TO PHQ QUESTIONS 1-9: 0
1. LITTLE INTEREST OR PLEASURE IN DOING THINGS: 0
SUM OF ALL RESPONSES TO PHQ9 QUESTIONS 1 & 2: 0
SUM OF ALL RESPONSES TO PHQ QUESTIONS 1-9: 0
SUM OF ALL RESPONSES TO PHQ QUESTIONS 1-9: 0
2. FEELING DOWN, DEPRESSED OR HOPELESS: 0
SUM OF ALL RESPONSES TO PHQ QUESTIONS 1-9: 0

## 2023-05-25 NOTE — PROGRESS NOTES
NAME Elizabeth Guerra         6/3/1849      MRN    [id number]      LAST OFFICE APPOINTMENT: Visit date not found     DIAGNOSIS    ICD-10-CM    1. CHF (congestive heart failure), NYHA class I, chronic, diastolic ([facility])  L37.25       2. Hypertension, unspecified type  I10       3. HAIDER (dyspnea on exertion)  R06.09       4. Swelling of lower extremity  M79.89       5. Pleural effusion, left  J90       6. CML (chronic myeloid leukemia) ([facility])  C92.10           HOME MEDICATION  Current Outpatient Medications   Medication Sig    furosemide (LASIX) 40 MG tablet Take 1 tablet by mouth daily    cyanocobalamin 1000 MCG/ML injection ADMINISTER 1 ML INTO MUSCLE ONCE MONTHLY AS DIRECTED    dasatinib (SPRYCEL) 100 MG chemo tablet Sprycel 100 mg tablet    doxazosin (CARDURA) 2 MG tablet Take by mouth    levothyroxine (SYNTHROID) 112 MCG tablet TAKE 1 TABLET DAILY BEFORE BREAKFAST    oxybutynin (DITROPAN) 5 MG tablet TAKE 1 TABLET BY MOUTH TWICE DAILY FOR URINARY CONTINENCE    sertraline (ZOLOFT) 100 MG tablet Take by mouth daily    triamcinolone (KENALOG) 0.1 % ointment Apply topically 2 times daily    valACYclovir (VALTREX) 500 MG tablet TAKE 1 TABLET BY MOUTH TWICE DAILY FOR 3 DAYS. MAX 6 DOSES PER OUTBREAK    amLODIPine (NORVASC) 5 MG tablet Take by mouth daily (Patient not taking: Reported on 2023)    dicyclomine (BENTYL) 10 MG capsule TAKE 1 CAPSULE BY MOUTH EVERY DAY (Patient not taking: Reported on 2023)    LORazepam (ATIVAN) 0.5 MG tablet TAKE 1 TABLET BY MOUTH EVERY 8 HOURS AS NEEDED FOR ANXIETY. MAX DAILY AMOUNT: 1.5 MG (Patient not taking: Reported on 2023)     No current facility-administered medications for this visit.      Facility-Administered Medications Ordered in Other Visits   Medication Dose Route Frequency    acetaminophen (TYLENOL) suppository 650 mg  650 mg Rectal Q6H PRN    Or    acetaminophen (TYLENOL) tablet 650 mg  650 mg Oral Q6H PRN    [Held by provider] aspirin chewable tablet 162 mg
05/05/2023 12:02 AM    K 3.9 05/05/2023 12:02 AM    CL 97 05/05/2023 12:02 AM    CO2 32 05/05/2023 12:02 AM    BUN 19 05/05/2023 12:02 AM    GFRAA 65 02/10/2022 08:21 AM    GLOB 3.3 05/01/2023 12:45 PM    ALT 18 05/01/2023 12:45 PM          ICD-10-CM    1. CHF (congestive heart failure), NYHA class I, chronic, diastolic (HCC)  J51.64       2. Hypertension, unspecified type  I10       3. HAIDER (dyspnea on exertion)  R06.09       4. Swelling of lower extremity  M79.89       5. Pleural effusion, left  J90       6. CML (chronic myeloid leukemia) (HCC)  C92.10               ASSESSMENT/RECOMMENDATIONS:.      1.  Shortness of breath lower extremity edema  -She did have an echo in the hospital and her EF was 65 to 70%. -There is no description of diastolic dysfunction  -Unclear the etiology of the pleural effusion but there is some documented cases of about 10% of individuals on Sprycel can have shortness of breath and pleural effusion although she has been on it for a while  -Reviewing the pathology did not indicate that there were any cancer cells or any other cells that were concerning  -I see no cardiac etiology for her pleural effusion  -We will repeat chest x-ray to ensure there is no reaccumulation of pleural effusion we will also check a BMP and if her potassium is low will need supplementation  -She is on 40 mg of Lasix reduced to 20 mg of Lasix  2. Hypertension  -Blood pressure 136/60 at goal  3. History of CML  4. PFO  -I think she should be on 81 mg of aspirin a day but also asked that they discuss this with the oncologist    Follow-up with me in 6 months    No orders of the defined types were placed in this encounter. We discussed the expected course, resolution and complications of the diagnosis(es) in detail. Medication risks, benefits, costs, interactions, and alternatives were discussed as indicated.   I advised him to contact the office if his condition worsens, changes or fails to improve as

## 2023-05-29 NOTE — RESULT ENCOUNTER NOTE
Your chest Xray looks like there is some fluid present on the left side so as I talked with your daughter we will try 3 days of diuretics and let me know how you are doing. When we talked on 5/29/23 your sounded good.       All the best,    Patricia Apple

## 2023-05-31 ENCOUNTER — TELEPHONE (OUTPATIENT)
Age: 78
End: 2023-05-31

## 2023-05-31 DIAGNOSIS — M79.89 SWELLING OF LOWER EXTREMITY: ICD-10-CM

## 2023-05-31 DIAGNOSIS — R06.09 DOE (DYSPNEA ON EXERTION): ICD-10-CM

## 2023-05-31 DIAGNOSIS — C92.10 CML (CHRONIC MYELOID LEUKEMIA) (HCC): ICD-10-CM

## 2023-05-31 DIAGNOSIS — J90 PLEURAL EFFUSION, LEFT: Primary | ICD-10-CM

## 2023-05-31 DIAGNOSIS — I50.32 CHF (CONGESTIVE HEART FAILURE), NYHA CLASS I, CHRONIC, DIASTOLIC (HCC): ICD-10-CM

## 2023-05-31 DIAGNOSIS — I10 HTN (HYPERTENSION): ICD-10-CM

## 2023-06-01 RX ORDER — BUMETANIDE 1 MG/1
1 TABLET ORAL DAILY
COMMUNITY
End: 2023-06-01 | Stop reason: SDUPTHER

## 2023-06-01 RX ORDER — BUMETANIDE 1 MG/1
1 TABLET ORAL DAILY
Qty: 30 TABLET | Refills: 5 | Status: SHIPPED | OUTPATIENT
Start: 2023-06-01

## 2023-06-05 ENCOUNTER — TELEPHONE (OUTPATIENT)
Age: 78
End: 2023-06-05

## 2023-06-05 SDOH — ECONOMIC STABILITY: HOUSING INSECURITY
IN THE LAST 12 MONTHS, WAS THERE A TIME WHEN YOU DID NOT HAVE A STEADY PLACE TO SLEEP OR SLEPT IN A SHELTER (INCLUDING NOW)?: NO

## 2023-06-05 SDOH — ECONOMIC STABILITY: FOOD INSECURITY: WITHIN THE PAST 12 MONTHS, THE FOOD YOU BOUGHT JUST DIDN'T LAST AND YOU DIDN'T HAVE MONEY TO GET MORE.: PATIENT DECLINED

## 2023-06-05 SDOH — ECONOMIC STABILITY: FOOD INSECURITY: WITHIN THE PAST 12 MONTHS, YOU WORRIED THAT YOUR FOOD WOULD RUN OUT BEFORE YOU GOT MONEY TO BUY MORE.: PATIENT DECLINED

## 2023-06-05 SDOH — ECONOMIC STABILITY: TRANSPORTATION INSECURITY
IN THE PAST 12 MONTHS, HAS LACK OF TRANSPORTATION KEPT YOU FROM MEETINGS, WORK, OR FROM GETTING THINGS NEEDED FOR DAILY LIVING?: NO

## 2023-06-05 SDOH — ECONOMIC STABILITY: INCOME INSECURITY: HOW HARD IS IT FOR YOU TO PAY FOR THE VERY BASICS LIKE FOOD, HOUSING, MEDICAL CARE, AND HEATING?: PATIENT DECLINED

## 2023-06-05 NOTE — TELEPHONE ENCOUNTER
Hector Melton from [facility] called and would like to speak to nurse, stated daughter is telling her that  Is giving her lab work to draw, stated it's unusual please advise    [phone]

## 2023-06-08 ENCOUNTER — OFFICE VISIT (OUTPATIENT)
Facility: CLINIC | Age: 78
End: 2023-06-08

## 2023-06-08 VITALS
TEMPERATURE: 97.3 F | HEART RATE: 97 BPM | BODY MASS INDEX: 25.27 KG/M2 | HEIGHT: 64 IN | OXYGEN SATURATION: 97 % | DIASTOLIC BLOOD PRESSURE: 62 MMHG | WEIGHT: 148 LBS | SYSTOLIC BLOOD PRESSURE: 130 MMHG

## 2023-06-08 DIAGNOSIS — J90 PLEURAL EFFUSION: ICD-10-CM

## 2023-06-08 DIAGNOSIS — M79.89 SWELLING OF LOWER EXTREMITY: ICD-10-CM

## 2023-06-08 DIAGNOSIS — F33.1 MODERATE EPISODE OF RECURRENT MAJOR DEPRESSIVE DISORDER (HCC): ICD-10-CM

## 2023-06-08 DIAGNOSIS — R09.02 HYPOXIA: ICD-10-CM

## 2023-06-08 DIAGNOSIS — F41.9 ANXIETY: Primary | ICD-10-CM

## 2023-06-08 DIAGNOSIS — Z09 HOSPITAL DISCHARGE FOLLOW-UP: ICD-10-CM

## 2023-06-08 RX ORDER — BUPROPION HYDROCHLORIDE 150 MG/1
150 TABLET ORAL EVERY MORNING
Qty: 90 TABLET | Refills: 1 | Status: SHIPPED | OUTPATIENT
Start: 2023-06-08

## 2023-06-08 ASSESSMENT — PATIENT HEALTH QUESTIONNAIRE - PHQ9
7. TROUBLE CONCENTRATING ON THINGS, SUCH AS READING THE NEWSPAPER OR WATCHING TELEVISION: 0
9. THOUGHTS THAT YOU WOULD BE BETTER OFF DEAD, OR OF HURTING YOURSELF: 1
5. POOR APPETITE OR OVEREATING: 0
1. LITTLE INTEREST OR PLEASURE IN DOING THINGS: 3
4. FEELING TIRED OR HAVING LITTLE ENERGY: 0
2. FEELING DOWN, DEPRESSED OR HOPELESS: 3
SUM OF ALL RESPONSES TO PHQ QUESTIONS 1-9: 9
8. MOVING OR SPEAKING SO SLOWLY THAT OTHER PEOPLE COULD HAVE NOTICED. OR THE OPPOSITE, BEING SO FIGETY OR RESTLESS THAT YOU HAVE BEEN MOVING AROUND A LOT MORE THAN USUAL: 0
SUM OF ALL RESPONSES TO PHQ QUESTIONS 1-9: 10
SUM OF ALL RESPONSES TO PHQ9 QUESTIONS 1 & 2: 6
SUM OF ALL RESPONSES TO PHQ QUESTIONS 1-9: 10
3. TROUBLE FALLING OR STAYING ASLEEP: 0
SUM OF ALL RESPONSES TO PHQ QUESTIONS 1-9: 10
10. IF YOU CHECKED OFF ANY PROBLEMS, HOW DIFFICULT HAVE THESE PROBLEMS MADE IT FOR YOU TO DO YOUR WORK, TAKE CARE OF THINGS AT HOME, OR GET ALONG WITH OTHER PEOPLE: 0
6. FEELING BAD ABOUT YOURSELF - OR THAT YOU ARE A FAILURE OR HAVE LET YOURSELF OR YOUR FAMILY DOWN: 3

## 2023-06-08 ASSESSMENT — ANXIETY QUESTIONNAIRES
7. FEELING AFRAID AS IF SOMETHING AWFUL MIGHT HAPPEN: 2
4. TROUBLE RELAXING: 3
6. BECOMING EASILY ANNOYED OR IRRITABLE: 1
5. BEING SO RESTLESS THAT IT IS HARD TO SIT STILL: 0
3. WORRYING TOO MUCH ABOUT DIFFERENT THINGS: 3
IF YOU CHECKED OFF ANY PROBLEMS ON THIS QUESTIONNAIRE, HOW DIFFICULT HAVE THESE PROBLEMS MADE IT FOR YOU TO DO YOUR WORK, TAKE CARE OF THINGS AT HOME, OR GET ALONG WITH OTHER PEOPLE: SOMEWHAT DIFFICULT
1. FEELING NERVOUS, ANXIOUS, OR ON EDGE: 3
2. NOT BEING ABLE TO STOP OR CONTROL WORRYING: 3
GAD7 TOTAL SCORE: 15

## 2023-06-08 ASSESSMENT — COLUMBIA-SUICIDE SEVERITY RATING SCALE - C-SSRS
1. WITHIN THE PAST MONTH, HAVE YOU WISHED YOU WERE DEAD OR WISHED YOU COULD GO TO SLEEP AND NOT WAKE UP?: YES
4. HAVE YOU HAD THESE THOUGHTS AND HAD SOME INTENTION OF ACTING ON THEM?: NO
3. HAVE YOU BEEN THINKING ABOUT HOW YOU MIGHT KILL YOURSELF?: NO
2. HAVE YOU ACTUALLY HAD ANY THOUGHTS OF KILLING YOURSELF?: YES
5. HAVE YOU STARTED TO WORK OUT OR WORKED OUT THE DETAILS OF HOW TO KILL YOURSELF? DO YOU INTEND TO CARRY OUT THIS PLAN?: NO
6. HAVE YOU EVER DONE ANYTHING, STARTED TO DO ANYTHING, OR PREPARED TO DO ANYTHING TO END YOUR LIFE?: NO

## 2023-06-20 ENCOUNTER — TELEPHONE (OUTPATIENT)
Facility: CLINIC | Age: 78
End: 2023-06-20

## 2023-06-20 RX ORDER — LEVOTHYROXINE SODIUM 112 UG/1
TABLET ORAL
Qty: 90 TABLET | Refills: 1 | Status: SHIPPED | OUTPATIENT
Start: 2023-06-20

## 2023-06-20 NOTE — TELEPHONE ENCOUNTER
Pt called in regards to needing a refill of Levothyroxine 112mcg refill to the [facility] at  [address]  Pt stated that she is completely out of medication and has not had meds in two days

## 2023-06-20 NOTE — TELEPHONE ENCOUNTER
Pt has been out of her medication Levothyroxine for two days and upset that it has not been sent over to pharmacy.   advised that doctors have 48 business hours to refill medication and pt demands a call back     Call pt at [phone]

## 2023-06-20 NOTE — TELEPHONE ENCOUNTER
Pt's daughter Flakito Miller is calling about pt's medication needing to be sent over to pharmacy and would like a call back with update as pt is getting upset it has not been sent over    Call seven at [phone]

## 2023-08-01 ENCOUNTER — OFFICE VISIT (OUTPATIENT)
Dept: PRIMARY CARE CLINIC | Facility: CLINIC | Age: 78
End: 2023-08-01
Payer: MEDICARE

## 2023-08-01 VITALS
HEART RATE: 92 BPM | SYSTOLIC BLOOD PRESSURE: 145 MMHG | OXYGEN SATURATION: 97 % | RESPIRATION RATE: 16 BRPM | HEIGHT: 65 IN | TEMPERATURE: 35.6 F | DIASTOLIC BLOOD PRESSURE: 75 MMHG | BODY MASS INDEX: 24.63 KG/M2

## 2023-08-01 DIAGNOSIS — F33.1 MODERATE EPISODE OF RECURRENT MAJOR DEPRESSIVE DISORDER (HCC): ICD-10-CM

## 2023-08-01 DIAGNOSIS — E03.9 ACQUIRED HYPOTHYROIDISM: ICD-10-CM

## 2023-08-01 DIAGNOSIS — I10 PRIMARY HYPERTENSION: ICD-10-CM

## 2023-08-01 DIAGNOSIS — F41.1 GENERALIZED ANXIETY DISORDER: Primary | ICD-10-CM

## 2023-08-01 DIAGNOSIS — C92.10 CML (CHRONIC MYELOID LEUKEMIA) (HCC): ICD-10-CM

## 2023-08-01 PROBLEM — J90 PLEURAL EFFUSION, LEFT: Status: RESOLVED | Noted: 2023-05-02 | Resolved: 2023-08-01

## 2023-08-01 PROBLEM — R06.09 DOE (DYSPNEA ON EXERTION): Status: RESOLVED | Noted: 2023-05-01 | Resolved: 2023-08-01

## 2023-08-01 PROCEDURE — 3078F DIAST BP <80 MM HG: CPT | Performed by: FAMILY MEDICINE

## 2023-08-01 PROCEDURE — 99215 OFFICE O/P EST HI 40 MIN: CPT | Performed by: FAMILY MEDICINE

## 2023-08-01 PROCEDURE — G8427 DOCREV CUR MEDS BY ELIG CLIN: HCPCS | Performed by: FAMILY MEDICINE

## 2023-08-01 PROCEDURE — G8420 CALC BMI NORM PARAMETERS: HCPCS | Performed by: FAMILY MEDICINE

## 2023-08-01 PROCEDURE — 3077F SYST BP >= 140 MM HG: CPT | Performed by: FAMILY MEDICINE

## 2023-08-01 PROCEDURE — 1123F ACP DISCUSS/DSCN MKR DOCD: CPT | Performed by: FAMILY MEDICINE

## 2023-08-01 PROCEDURE — G8399 PT W/DXA RESULTS DOCUMENT: HCPCS | Performed by: FAMILY MEDICINE

## 2023-08-01 PROCEDURE — 1036F TOBACCO NON-USER: CPT | Performed by: FAMILY MEDICINE

## 2023-08-01 PROCEDURE — 1090F PRES/ABSN URINE INCON ASSESS: CPT | Performed by: FAMILY MEDICINE

## 2023-08-01 RX ORDER — SERTRALINE HYDROCHLORIDE 25 MG/1
25 TABLET, FILM COATED ORAL DAILY
Qty: 7 TABLET | Refills: 0 | Status: SHIPPED | OUTPATIENT
Start: 2023-08-01 | End: 2023-08-08

## 2023-08-01 RX ORDER — VENLAFAXINE HYDROCHLORIDE 37.5 MG/1
37.5 CAPSULE, EXTENDED RELEASE ORAL DAILY
Qty: 30 CAPSULE | Refills: 2 | Status: SHIPPED | OUTPATIENT
Start: 2023-08-01

## 2023-08-01 RX ORDER — IMATINIB MESYLATE 100 MG/1
100 TABLET, FILM COATED ORAL DAILY
COMMUNITY

## 2023-08-01 NOTE — PROGRESS NOTES
Identified Patient with two Patient identifiers(name and ). 1. Have you been to the ER, urgent care clinic since your last visit? Hospitalized since your last visit? No    2. Have you seen or consulted any other health care providers outside of the [address] [place] [address] since your last visit? No     3. For patients aged 43-73: Has the patient had a colonoscopy / FIT/ Cologuard? No    If the patient is female:    4. For patients aged 43-66: Has the patient had a mammogram within the past 2 years? NA - based on age/sex      5. For patients aged 21-65: Has the patient had a pap smear? NA - based on age/sex   There were no vitals taken for this visit. Chief Complaint   Patient presents with    New Patient     establish       Health Maintenance Due   Topic Date Due    Shingles vaccine (1 of 2) 2012    Annual Wellness Visit (AWV)  Never done    COVID-19 Vaccine (6 - Booster for Pfizer series) 2022    Flu vaccine (1) 2023      Patient-Entered Cms Msp: Part I       Question 2023  5:50 PM EDT - Adriana Styles by Patient    Medicare requires that we periodically ask the following questions. Are you receiving Black Lung (BL) benefits? No    Are the services to be paid by a government research program? No    Are you entitled to benefits through the [address] ([facility])? No    Was the illness/injury due to a work-related accident/condition? No    Was the illness/injury due to a non-work-related accident? No    Are you entitled to Medicare based on:     Age? Yes    End-stage renal disease (ESRD)? No            Patient-Entered Msp: Age-Disability-Esrd Primary Branch Calculation (range: 0 - 5) 1    Are you currently employed? Do you have a spouse who is currently employed? Thank you for answering this questionnaire.
not desire to use it any longer. Return in about 1 month (around 9/1/2023) for medicare wellness/mood follow-up. SUBJECTIVE/OBJECTIVE:  HPI    This is a pleasant 49-year-old female with past medical history of CML, hypertension, hypothyroidism, anxiety and depression presents today to establish care with new provider. She was previously under the care of Dr. Olita Boas who is no longer practicing in the area. Patient was recently discharged from the hospital for left pleural effusion, acute hypoxic respiratory failure, right atrial thrombus and swelling of the left lower extremity. Anxiety seemed to get worse after her hospitalization and when seen by Dr. Krishna Ordonez, he prescribed Wellbutrin  mg once daily. Patient states she had already been taking sertraline 100 mg prior to the addition of Wellbutrin. Patient states that she lives independently and enjoys activities such as coloring on her phone and reading romance novels. She values her independence and her family including her great granddaughter and 2 grandchildren. Patient states that there are certain intrusive thoughts that trigger her anxiety. Patient mentioned on of those thought as \"I will die in my apartment and no one will know. \"    Allergies   Allergen Reactions    Penicillins Itching     Current Outpatient Medications   Medication Sig Dispense Refill    imatinib (GLEEVEC) 100 MG chemo tablet Take 1 tablet by mouth daily      sertraline (ZOLOFT) 25 MG tablet Take 1 tablet by mouth daily for 7 days 7 tablet 0    venlafaxine (EFFEXOR XR) 37.5 MG extended release capsule Take 1 capsule by mouth daily 30 capsule 2    levothyroxine (SYNTHROID) 112 MCG tablet TAKE 1 TABLET BY MOUTH DAILY BEFORE BREAKFAST 90 tablet 1    buPROPion (WELLBUTRIN XL) 150 MG extended release tablet Take 1 tablet by mouth every morning 90 tablet 1    cyanocobalamin 1000 MCG/ML injection ADMINISTER 1 ML INTO MUSCLE ONCE MONTHLY AS DIRECTED      doxazosin

## 2023-08-01 NOTE — PATIENT INSTRUCTIONS
Take 1/2 tablet of sertraline 100 mg for 1 week, then  Take 1 tablet of sertraline 25 mg for 1 week. Also, start taking venlafaxine 37.5 mg in the morning. Only take venlafaxine 37.5 mg daily starting week 3.

## 2023-08-11 RX ORDER — SERTRALINE HYDROCHLORIDE 100 MG/1
TABLET, FILM COATED ORAL
Qty: 90 TABLET | OUTPATIENT
Start: 2023-08-11

## 2023-10-10 ENCOUNTER — OFFICE VISIT (OUTPATIENT)
Dept: PRIMARY CARE CLINIC | Facility: CLINIC | Age: 78
End: 2023-10-10

## 2023-10-10 VITALS
TEMPERATURE: 98.3 F | WEIGHT: 155.4 LBS | OXYGEN SATURATION: 98 % | SYSTOLIC BLOOD PRESSURE: 134 MMHG | HEIGHT: 65 IN | DIASTOLIC BLOOD PRESSURE: 64 MMHG | BODY MASS INDEX: 25.89 KG/M2 | RESPIRATION RATE: 16 BRPM

## 2023-10-10 DIAGNOSIS — E53.8 B12 DEFICIENCY: ICD-10-CM

## 2023-10-10 DIAGNOSIS — Z00.00 MEDICARE ANNUAL WELLNESS VISIT, SUBSEQUENT: Primary | ICD-10-CM

## 2023-10-10 DIAGNOSIS — Z78.0 POSTMENOPAUSAL: ICD-10-CM

## 2023-10-10 DIAGNOSIS — F41.1 GENERALIZED ANXIETY DISORDER: ICD-10-CM

## 2023-10-10 DIAGNOSIS — F33.1 MODERATE EPISODE OF RECURRENT MAJOR DEPRESSIVE DISORDER (HCC): ICD-10-CM

## 2023-10-10 DIAGNOSIS — Z23 IMMUNIZATION DUE: ICD-10-CM

## 2023-10-10 RX ORDER — VENLAFAXINE HYDROCHLORIDE 75 MG/1
75 CAPSULE, EXTENDED RELEASE ORAL DAILY
Qty: 30 CAPSULE | Refills: 3 | Status: SHIPPED | OUTPATIENT
Start: 2023-10-10

## 2023-10-10 NOTE — PROGRESS NOTES
Identified Patient with two Patient identifiers(name and ). 1. Have you been to the ER, urgent care clinic since your last visit? Hospitalized since your last visit? No    2. Have you seen or consulted any other health care providers outside of the [address] [place] [address] since your last visit? No     3. For patients aged 43-73: Has the patient had a colonoscopy / FIT/ Cologuard? NA - based on age/sex    If the patient is female:    4. For patients aged 43-66: Has the patient had a mammogram within the past 2 years? NA - based on age/sex      5. For patients aged 21-65: Has the patient had a pap smear? NA - based on age/sex   There were no vitals taken for this visit. Chief Complaint   Patient presents with    Medicare AWV     Medication check       Health Maintenance Due   Topic Date Due    Shingles vaccine (1 of 2) 2012    Annual Wellness Visit (AWV)  Never done    COVID-19 Vaccine (6 - Pfizer risk series) 2022    Flu vaccine (1) 2023          No questionnaires available.

## 2023-10-11 LAB — VIT B12 SERPL-MCNC: 391 PG/ML (ref 232–1245)

## 2023-10-12 ENCOUNTER — TELEPHONE (OUTPATIENT)
Dept: PRIMARY CARE CLINIC | Facility: CLINIC | Age: 78
End: 2023-10-12

## 2023-10-12 NOTE — TELEPHONE ENCOUNTER
Medhat Daley is requesting a refill on be cyanocobalamin.      Patient's last appointment was 10/10/2023  Next visit is scheduled for Visit date not found   Please send medication to:   [address] [phone] - F [phone] [address] [facility] [address]  Phone: [phone] Fax: [phone]

## 2023-10-16 RX ORDER — CYANOCOBALAMIN 1000 UG/ML
INJECTION, SOLUTION INTRAMUSCULAR; SUBCUTANEOUS
Qty: 3 ML | Refills: 1 | Status: SHIPPED | OUTPATIENT
Start: 2023-10-16

## 2023-10-16 RX ORDER — CYANOCOBALAMIN 1000 UG/ML
INJECTION INTRAMUSCULAR; SUBCUTANEOUS
Qty: 3 ML | OUTPATIENT
Start: 2023-10-16

## 2023-10-29 DIAGNOSIS — F41.1 GENERALIZED ANXIETY DISORDER: ICD-10-CM

## 2023-10-29 DIAGNOSIS — F33.1 MODERATE EPISODE OF RECURRENT MAJOR DEPRESSIVE DISORDER (HCC): ICD-10-CM

## 2023-10-30 RX ORDER — VENLAFAXINE HYDROCHLORIDE 37.5 MG/1
37.5 CAPSULE, EXTENDED RELEASE ORAL DAILY
Qty: 90 CAPSULE | Refills: 0 | OUTPATIENT
Start: 2023-10-30

## 2023-11-07 ENCOUNTER — TELEPHONE (OUTPATIENT)
Age: 78
End: 2023-11-07

## 2023-11-07 NOTE — TELEPHONE ENCOUNTER
Pt. Stated she was seen by Dr. Knapp Found at [facility], wants to know where the oxygen was ordered from that she received. Pt.  Phone - [phone]

## 2023-12-01 RX ORDER — DOXAZOSIN 2 MG/1
2 TABLET ORAL NIGHTLY
Qty: 90 TABLET | Refills: 0 | Status: SHIPPED | OUTPATIENT
Start: 2023-12-01

## 2023-12-11 ENCOUNTER — TELEMEDICINE (OUTPATIENT)
Dept: PRIMARY CARE CLINIC | Facility: CLINIC | Age: 78
End: 2023-12-11
Payer: MEDICARE

## 2023-12-11 DIAGNOSIS — F33.1 MODERATE EPISODE OF RECURRENT MAJOR DEPRESSIVE DISORDER (HCC): ICD-10-CM

## 2023-12-11 DIAGNOSIS — E03.9 ACQUIRED HYPOTHYROIDISM: ICD-10-CM

## 2023-12-11 DIAGNOSIS — F41.1 GENERALIZED ANXIETY DISORDER: ICD-10-CM

## 2023-12-11 DIAGNOSIS — I10 PRIMARY HYPERTENSION: Primary | ICD-10-CM

## 2023-12-11 PROCEDURE — G8427 DOCREV CUR MEDS BY ELIG CLIN: HCPCS | Performed by: FAMILY MEDICINE

## 2023-12-11 PROCEDURE — G8399 PT W/DXA RESULTS DOCUMENT: HCPCS | Performed by: FAMILY MEDICINE

## 2023-12-11 PROCEDURE — G8419 CALC BMI OUT NRM PARAM NOF/U: HCPCS | Performed by: FAMILY MEDICINE

## 2023-12-11 PROCEDURE — 1090F PRES/ABSN URINE INCON ASSESS: CPT | Performed by: FAMILY MEDICINE

## 2023-12-11 PROCEDURE — 1036F TOBACCO NON-USER: CPT | Performed by: FAMILY MEDICINE

## 2023-12-11 PROCEDURE — 1123F ACP DISCUSS/DSCN MKR DOCD: CPT | Performed by: FAMILY MEDICINE

## 2023-12-11 PROCEDURE — G8484 FLU IMMUNIZE NO ADMIN: HCPCS | Performed by: FAMILY MEDICINE

## 2023-12-11 PROCEDURE — 99214 OFFICE O/P EST MOD 30 MIN: CPT | Performed by: FAMILY MEDICINE

## 2023-12-11 RX ORDER — DOXAZOSIN 2 MG/1
2 TABLET ORAL NIGHTLY
Qty: 90 TABLET | Refills: 1 | Status: SHIPPED | OUTPATIENT
Start: 2023-12-11

## 2023-12-11 RX ORDER — BUPROPION HYDROCHLORIDE 150 MG/1
150 TABLET ORAL EVERY MORNING
Qty: 90 TABLET | Refills: 1 | Status: SHIPPED | OUTPATIENT
Start: 2023-12-11

## 2023-12-11 NOTE — PROGRESS NOTES
Identified Patient with two Patient identifiers(name and ). 1. Have you been to the ER, urgent care clinic since your last visit? Hospitalized since your last visit? No    2. Have you seen or consulted any other health care providers outside of the [address] [place] [address] since your last visit? No     3. For patients aged 43-73: Has the patient had a colonoscopy / FIT/ Cologuard? No    If the patient is female:    4. For patients aged 43-66: Has the patient had a mammogram within the past 2 years? No      5. For patients aged 21-65: Has the patient had a pap smear? NA - based on age/sex   There were no vitals taken for this visit. Chief Complaint   Patient presents with    Other     Rx r/f no acute issues       Health Maintenance Due   Topic Date Due    Respiratory Syncytial Virus (RSV) age 61 yrs+ (3 - 1-dose 60+ series) Never done    Shingles vaccine (1 of 2) 2012    COVID-19 Vaccine (2023- season) 2023          No questionnaires available.
Abnormal -        [] No Hallucinations    Other pertinent observable physical exam findings:-             --Ashok Hamilton MD

## 2024-02-12 DIAGNOSIS — F41.1 GENERALIZED ANXIETY DISORDER: ICD-10-CM

## 2024-02-12 DIAGNOSIS — E03.9 ACQUIRED HYPOTHYROIDISM: Primary | ICD-10-CM

## 2024-02-12 DIAGNOSIS — I10 PRIMARY HYPERTENSION: ICD-10-CM

## 2024-02-12 DIAGNOSIS — F33.1 MODERATE EPISODE OF RECURRENT MAJOR DEPRESSIVE DISORDER (HCC): ICD-10-CM

## 2024-02-12 RX ORDER — LEVOTHYROXINE SODIUM 112 UG/1
112 TABLET ORAL
Qty: 90 TABLET | Refills: 1 | Status: SHIPPED | OUTPATIENT
Start: 2024-02-12

## 2024-02-12 RX ORDER — VALACYCLOVIR HYDROCHLORIDE 500 MG/1
TABLET, FILM COATED ORAL
Status: CANCELLED | OUTPATIENT
Start: 2024-02-12

## 2024-02-12 RX ORDER — VENLAFAXINE HYDROCHLORIDE 75 MG/1
75 CAPSULE, EXTENDED RELEASE ORAL DAILY
Qty: 90 CAPSULE | Refills: 1 | Status: SHIPPED | OUTPATIENT
Start: 2024-02-12

## 2024-02-12 NOTE — TELEPHONE ENCOUNTER
----- Message from Joana Michele sent at 2/12/2024  8:44 AM EST -----  Subject: Refill Request    QUESTIONS  Name of Medication? levothyroxine (SYNTHROID) 112 MCG tablet  Patient-reported dosage and instructions? Once daily  How many days do you have left? 30  Preferred Pharmacy? [facility] #[id number]  Pharmacy phone number (if available)? [phone]  ---------------------------------------------------------------------------  --------------,  Name of Medication? buPROPion (WELLBUTRIN XL) 150 MG extended release   tablet  Patient-reported dosage and instructions? Once daily  How many days do you have left? 30  Preferred Pharmacy? [facility] #[id number]  Pharmacy phone number (if available)? [phone]  ---------------------------------------------------------------------------  --------------,  Name of Medication? doxazosin (CARDURA) 2 MG tablet  Patient-reported dosage and instructions? Once nightly  How many days do you have left? 14  Preferred Pharmacy? [facility] #[id number]  Pharmacy phone number (if available)? [phone]  ---------------------------------------------------------------------------  --------------,  Name of Medication? venlafaxine (EFFEXOR XR) 75 MG extended release   capsule  Patient-reported dosage and instructions? Once daily  How many days do you have left? 14  Preferred Pharmacy? [facility] #[id number]  Pharmacy phone number (if available)? [phone]  ---------------------------------------------------------------------------  --------------  CALL BACK INFO  What is the best way for the office to contact you? OK to leave message on   voicemail  Preferred Call Back Phone Number? [phone]  ---------------------------------------------------------------------------  --------------  SCRIPT ANSWERS  Relationship to Patient? Self

## 2024-02-12 NOTE — TELEPHONE ENCOUNTER
----- Message from Joana Michele sent at 2/12/2024  8:45 AM EST -----  Subject: Refill Request    QUESTIONS  Name of Medication? valACYclovir (VALTREX) 500 MG tablet  Patient-reported dosage and instructions? As needed  How many days do you have left? 0  [facility]? [id number]  Pharmacy phone number (if available)? [phone]  ---------------------------------------------------------------------------  --------------  CALL BACK INFO  What is the best way for the office to contact you? OK to leave message on   voicemail  Preferred Call Back Phone Number? [phone]  ---------------------------------------------------------------------------  --------------  SCRIPT ANSWERS  Relationship to Patient? Self

## 2024-03-04 ENCOUNTER — TELEPHONE (OUTPATIENT)
Dept: PRIMARY CARE CLINIC | Facility: CLINIC | Age: 79
End: 2024-03-04

## 2024-03-04 DIAGNOSIS — I10 PRIMARY HYPERTENSION: ICD-10-CM

## 2024-03-04 RX ORDER — DOXAZOSIN 2 MG/1
2 TABLET ORAL NIGHTLY
Qty: 90 TABLET | Refills: 1 | Status: SHIPPED | OUTPATIENT
Start: 2024-03-04

## 2024-03-04 NOTE — TELEPHONE ENCOUNTER
Mari Centeno is requesting a refill on DOXAZOSIN .   Currently has 3 days remaining.   Next visit is scheduled for 6/17/2024   Please send medication to: [address]

## 2024-04-22 DIAGNOSIS — I50.32 CHF (CONGESTIVE HEART FAILURE), NYHA CLASS I, CHRONIC, DIASTOLIC (HCC): ICD-10-CM

## 2024-04-22 DIAGNOSIS — I10 HYPERTENSION, UNSPECIFIED TYPE: ICD-10-CM

## 2024-04-22 DIAGNOSIS — M79.89 SWELLING OF LOWER EXTREMITY: ICD-10-CM

## 2024-04-22 DIAGNOSIS — C92.10 CML (CHRONIC MYELOID LEUKEMIA) (HCC): ICD-10-CM

## 2024-04-22 DIAGNOSIS — J90 PLEURAL EFFUSION, LEFT: ICD-10-CM

## 2024-04-22 DIAGNOSIS — R06.09 DOE (DYSPNEA ON EXERTION): ICD-10-CM

## 2024-06-07 DIAGNOSIS — F33.1 MODERATE EPISODE OF RECURRENT MAJOR DEPRESSIVE DISORDER (HCC): ICD-10-CM

## 2024-06-07 DIAGNOSIS — F41.1 GENERALIZED ANXIETY DISORDER: ICD-10-CM

## 2024-06-07 RX ORDER — BUPROPION HYDROCHLORIDE 150 MG/1
150 TABLET ORAL EVERY MORNING
Qty: 90 TABLET | Refills: 0 | Status: SHIPPED | OUTPATIENT
Start: 2024-06-07

## 2024-06-07 NOTE — TELEPHONE ENCOUNTER
Mari Centeno is requesting a refill on buPROPion (WELLBUTRIN XL) 150 MG extended release tablet  .   Please send medication to:   [id number] - [place], [place] - [address] - P [phone] - F [phone] [address] [facility] [address]  Phone: [phone]  Fax: [phone]     Patient's last appointment was 12/11/2023   Next visit is scheduled for 6/17/2024

## 2024-06-17 ENCOUNTER — OFFICE VISIT (OUTPATIENT)
Dept: PRIMARY CARE CLINIC | Facility: CLINIC | Age: 79
End: 2024-06-17
Payer: MEDICARE

## 2024-06-17 VITALS
DIASTOLIC BLOOD PRESSURE: 64 MMHG | HEART RATE: 88 BPM | TEMPERATURE: 97.2 F | SYSTOLIC BLOOD PRESSURE: 127 MMHG | HEIGHT: 65 IN | WEIGHT: 146 LBS | BODY MASS INDEX: 24.32 KG/M2 | RESPIRATION RATE: 16 BRPM

## 2024-06-17 DIAGNOSIS — B00.9 HSV-2 INFECTION: ICD-10-CM

## 2024-06-17 DIAGNOSIS — I10 PRIMARY HYPERTENSION: Chronic | ICD-10-CM

## 2024-06-17 DIAGNOSIS — F41.1 GENERALIZED ANXIETY DISORDER: ICD-10-CM

## 2024-06-17 DIAGNOSIS — C92.10 CML (CHRONIC MYELOID LEUKEMIA) (HCC): ICD-10-CM

## 2024-06-17 DIAGNOSIS — I50.32 CHF (CONGESTIVE HEART FAILURE), NYHA CLASS I, CHRONIC, DIASTOLIC (HCC): ICD-10-CM

## 2024-06-17 DIAGNOSIS — F33.1 MODERATE EPISODE OF RECURRENT MAJOR DEPRESSIVE DISORDER (HCC): Primary | ICD-10-CM

## 2024-06-17 DIAGNOSIS — E03.9 ACQUIRED HYPOTHYROIDISM: ICD-10-CM

## 2024-06-17 PROCEDURE — G8420 CALC BMI NORM PARAMETERS: HCPCS | Performed by: NURSE PRACTITIONER

## 2024-06-17 PROCEDURE — G8427 DOCREV CUR MEDS BY ELIG CLIN: HCPCS | Performed by: NURSE PRACTITIONER

## 2024-06-17 PROCEDURE — 1123F ACP DISCUSS/DSCN MKR DOCD: CPT | Performed by: NURSE PRACTITIONER

## 2024-06-17 PROCEDURE — 3078F DIAST BP <80 MM HG: CPT | Performed by: NURSE PRACTITIONER

## 2024-06-17 PROCEDURE — G8399 PT W/DXA RESULTS DOCUMENT: HCPCS | Performed by: NURSE PRACTITIONER

## 2024-06-17 PROCEDURE — 1090F PRES/ABSN URINE INCON ASSESS: CPT | Performed by: NURSE PRACTITIONER

## 2024-06-17 PROCEDURE — 1036F TOBACCO NON-USER: CPT | Performed by: NURSE PRACTITIONER

## 2024-06-17 PROCEDURE — 3074F SYST BP LT 130 MM HG: CPT | Performed by: NURSE PRACTITIONER

## 2024-06-17 PROCEDURE — 99214 OFFICE O/P EST MOD 30 MIN: CPT | Performed by: NURSE PRACTITIONER

## 2024-06-17 RX ORDER — VALACYCLOVIR HYDROCHLORIDE 500 MG/1
TABLET, FILM COATED ORAL
Qty: 30 TABLET | Refills: 1 | Status: SHIPPED | OUTPATIENT
Start: 2024-06-17

## 2024-06-17 RX ORDER — IMATINIB MESYLATE 400 MG/1
TABLET, FILM COATED ORAL
COMMUNITY
Start: 2023-06-20

## 2024-06-17 RX ORDER — BUPROPION HYDROCHLORIDE 150 MG/1
150 TABLET ORAL EVERY MORNING
Qty: 90 TABLET | Refills: 1 | Status: SHIPPED | OUTPATIENT
Start: 2024-06-17

## 2024-06-17 RX ORDER — LEVOTHYROXINE SODIUM 112 UG/1
112 TABLET ORAL
Qty: 90 TABLET | Refills: 1 | Status: SHIPPED | OUTPATIENT
Start: 2024-06-17 | End: 2024-06-18 | Stop reason: SDUPTHER

## 2024-06-17 RX ORDER — DOXAZOSIN 2 MG/1
2 TABLET ORAL NIGHTLY
Qty: 90 TABLET | Refills: 1 | Status: SHIPPED | OUTPATIENT
Start: 2024-06-17

## 2024-06-17 RX ORDER — VENLAFAXINE HYDROCHLORIDE 75 MG/1
75 CAPSULE, EXTENDED RELEASE ORAL DAILY
Qty: 90 CAPSULE | Refills: 1 | Status: SHIPPED | OUTPATIENT
Start: 2024-06-17

## 2024-06-17 ASSESSMENT — PATIENT HEALTH QUESTIONNAIRE - PHQ9
SUM OF ALL RESPONSES TO PHQ QUESTIONS 1-9: 4
SUM OF ALL RESPONSES TO PHQ QUESTIONS 1-9: 4
2. FEELING DOWN, DEPRESSED OR HOPELESS: SEVERAL DAYS
SUM OF ALL RESPONSES TO PHQ9 QUESTIONS 1 & 2: 2
7. TROUBLE CONCENTRATING ON THINGS, SUCH AS READING THE NEWSPAPER OR WATCHING TELEVISION: NOT AT ALL
10. IF YOU CHECKED OFF ANY PROBLEMS, HOW DIFFICULT HAVE THESE PROBLEMS MADE IT FOR YOU TO DO YOUR WORK, TAKE CARE OF THINGS AT HOME, OR GET ALONG WITH OTHER PEOPLE: NOT DIFFICULT AT ALL
5. POOR APPETITE OR OVEREATING: NOT AT ALL
6. FEELING BAD ABOUT YOURSELF - OR THAT YOU ARE A FAILURE OR HAVE LET YOURSELF OR YOUR FAMILY DOWN: NOT AT ALL
4. FEELING TIRED OR HAVING LITTLE ENERGY: SEVERAL DAYS
1. LITTLE INTEREST OR PLEASURE IN DOING THINGS: SEVERAL DAYS
9. THOUGHTS THAT YOU WOULD BE BETTER OFF DEAD, OR OF HURTING YOURSELF: NOT AT ALL
8. MOVING OR SPEAKING SO SLOWLY THAT OTHER PEOPLE COULD HAVE NOTICED. OR THE OPPOSITE, BEING SO FIGETY OR RESTLESS THAT YOU HAVE BEEN MOVING AROUND A LOT MORE THAN USUAL: NOT AT ALL
3. TROUBLE FALLING OR STAYING ASLEEP: SEVERAL DAYS
SUM OF ALL RESPONSES TO PHQ QUESTIONS 1-9: 4
SUM OF ALL RESPONSES TO PHQ QUESTIONS 1-9: 4

## 2024-06-17 ASSESSMENT — ENCOUNTER SYMPTOMS
CHEST TIGHTNESS: 0
SHORTNESS OF BREATH: 0

## 2024-06-17 NOTE — PROGRESS NOTES
Chief Complaint   Patient presents with    Follow-up Chronic Condition     PHQ-9 Total Score: 0 (6/17/2024  2:07 PM)  Thoughts that you would be better off dead, or of hurting yourself in some way: 0 (6/17/2024  2:07 PM)    \"Have you been to the ER, urgent care clinic since your last visit?  Hospitalized since your last visit?\"    NO    “Have you seen or consulted any other health care providers outside of [facility] since your last visit?”    NO            Click Here for Release of Records Request               Resp 16   Ht 1.651 m (5' 5\")   Wt 66.2 kg (146 lb)   BMI 24.30 kg/m²       12/11/2023     9:51 AM   Amb Fall Risk Assessment and TUG Test   Do you feel unsteady or are you worried about falling?  no   2 or more falls in past year? no   Fall with injury in past year? no

## 2024-06-17 NOTE — PROGRESS NOTES
Mari Centeno is a 79 y.o. female who was seen in clinic today (6/17/2024).    Assessment & Plan:   Below is the assessment and plan developed based on review of pertinent history, physical exam, labs, studies, and medications.    1. Moderate episode of recurrent major depressive disorder (HCC)  Comments:  stable on current medication  Orders:  -     buPROPion (WELLBUTRIN XL) 150 MG extended release tablet; Take 1 tablet by mouth every morning, Disp-90 tablet, R-1Normal  -     venlafaxine (EFFEXOR XR) 75 MG extended release capsule; Take 1 capsule by mouth daily, Disp-90 capsule, R-1Normal  2. Generalized anxiety disorder  Comments:  stable on current medication  Orders:  -     buPROPion (WELLBUTRIN XL) 150 MG extended release tablet; Take 1 tablet by mouth every morning, Disp-90 tablet, R-1Normal  -     venlafaxine (EFFEXOR XR) 75 MG extended release capsule; Take 1 capsule by mouth daily, Disp-90 capsule, R-1Normal  3. Primary hypertension  Comments:  well controlled on current medications.  Orders:  -     doxazosin (CARDURA) 2 MG tablet; Take 1 tablet by mouth nightly, Disp-90 tablet, R-1Normal  -     Lipid Panel  -     Comprehensive Metabolic Panel  4. Acquired hypothyroidism  Comments:  stable on synthroid.   Will monitor labs.  Orders:  -     levothyroxine (SYNTHROID) 112 MCG tablet; Take 1 tablet by mouth every morning (before breakfast), Disp-90 tablet, R-1Normal  -     TSH + Free T4 Panel  5. HSV-2 infection  -     valACYclovir (VALTREX) 500 MG tablet; TAKE 1 TABLET BY MOUTH TWICE DAILY FOR 3 DAYS. MAX 6 DOSES PER OUTBREAK, Disp-30 tablet, R-1Normal  6. CML (chronic myeloid leukemia) ([facility])  Assessment & Plan:   Monitored by specialist- no acute findings meriting change in the plan  follows with [facility]  7. CHF (congestive heart failure), NYHA class I, chronic, diastolic ([facility])  Assessment & Plan:   Monitored by specialist- no acute findings meriting change in the plan  Dr. Carey.       Return in about

## 2024-06-18 LAB
ALBUMIN SERPL-MCNC: 3.8 G/DL (ref 3.7–4.7)
ALP SERPL-CCNC: 111 IU/L (ref 44–121)
ALT SERPL-CCNC: 7 IU/L (ref 0–32)
AST SERPL-CCNC: 26 IU/L (ref 0–40)
BILIRUB SERPL-MCNC: 0.4 MG/DL (ref 0–1.2)
BUN SERPL-MCNC: 16 MG/DL (ref 8–27)
BUN/CREAT SERPL: 14 (ref 12–28)
CALCIUM SERPL-MCNC: 8.6 MG/DL (ref 8.7–10.3)
CHLORIDE SERPL-SCNC: 101 MMOL/L (ref 96–106)
CHOLEST SERPL-MCNC: 122 MG/DL (ref 100–199)
CO2 SERPL-SCNC: 23 MMOL/L (ref 20–29)
CREAT SERPL-MCNC: 1.13 MG/DL (ref 0.57–1)
EGFRCR SERPLBLD CKD-EPI 2021: 49 ML/MIN/1.73
GLOBULIN SER CALC-MCNC: 1.9 G/DL (ref 1.5–4.5)
GLUCOSE SERPL-MCNC: 97 MG/DL (ref 70–99)
HDLC SERPL-MCNC: 71 MG/DL
LDLC SERPL CALC-MCNC: 39 MG/DL (ref 0–99)
POTASSIUM SERPL-SCNC: 3.7 MMOL/L (ref 3.5–5.2)
PROT SERPL-MCNC: 5.7 G/DL (ref 6–8.5)
SODIUM SERPL-SCNC: 134 MMOL/L (ref 134–144)
T4 FREE SERPL-MCNC: 1.49 NG/DL (ref 0.82–1.77)
TRIGL SERPL-MCNC: 50 MG/DL (ref 0–149)
TSH SERPL DL<=0.005 MIU/L-ACNC: 11.8 UIU/ML (ref 0.45–4.5)
VLDLC SERPL CALC-MCNC: 12 MG/DL (ref 5–40)

## 2024-06-18 RX ORDER — LEVOTHYROXINE SODIUM 0.12 MG/1
125 TABLET ORAL
Qty: 90 TABLET | Refills: 0 | Status: SHIPPED | OUTPATIENT
Start: 2024-06-18

## 2024-07-18 DIAGNOSIS — E03.9 ACQUIRED HYPOTHYROIDISM: ICD-10-CM

## 2024-08-23 DIAGNOSIS — F33.1 MODERATE EPISODE OF RECURRENT MAJOR DEPRESSIVE DISORDER (HCC): ICD-10-CM

## 2024-08-23 DIAGNOSIS — F41.1 GENERALIZED ANXIETY DISORDER: ICD-10-CM

## 2024-08-23 RX ORDER — VENLAFAXINE HYDROCHLORIDE 75 MG/1
75 CAPSULE, EXTENDED RELEASE ORAL DAILY
Qty: 90 CAPSULE | Refills: 1 | Status: SHIPPED | OUTPATIENT
Start: 2024-08-23

## 2024-09-22 DIAGNOSIS — E03.9 ACQUIRED HYPOTHYROIDISM: ICD-10-CM

## 2024-09-23 RX ORDER — LEVOTHYROXINE SODIUM 125 UG/1
125 TABLET ORAL
Qty: 90 TABLET | Refills: 0 | Status: SHIPPED | OUTPATIENT
Start: 2024-09-23

## 2024-12-05 DIAGNOSIS — E03.9 ACQUIRED HYPOTHYROIDISM: ICD-10-CM

## 2024-12-05 DIAGNOSIS — F41.1 GENERALIZED ANXIETY DISORDER: ICD-10-CM

## 2024-12-05 DIAGNOSIS — F33.1 MODERATE EPISODE OF RECURRENT MAJOR DEPRESSIVE DISORDER (HCC): ICD-10-CM

## 2024-12-06 ENCOUNTER — TELEPHONE (OUTPATIENT)
Dept: PRIMARY CARE CLINIC | Facility: CLINIC | Age: 79
End: 2024-12-06

## 2024-12-06 RX ORDER — LEVOTHYROXINE SODIUM 112 UG/1
112 TABLET ORAL
Qty: 90 TABLET | Refills: 1 | OUTPATIENT
Start: 2024-12-06

## 2024-12-06 RX ORDER — LEVOTHYROXINE SODIUM 125 UG/1
125 TABLET ORAL
Qty: 90 TABLET | Refills: 0 | Status: SHIPPED | OUTPATIENT
Start: 2024-12-06

## 2024-12-06 RX ORDER — BUPROPION HYDROCHLORIDE 150 MG/1
150 TABLET ORAL EVERY MORNING
Qty: 90 TABLET | Refills: 0 | Status: SHIPPED | OUTPATIENT
Start: 2024-12-06

## 2024-12-06 NOTE — TELEPHONE ENCOUNTER
Patient called and stated that wrong levothyroxine was called in. States she takes the 112mcg not 125. If it can please be resent.

## 2024-12-06 NOTE — TELEPHONE ENCOUNTER
Patient needs refilled  levothyroxine (SYNTHROID) tablet 112 mcg .    PT is no longer taking the     levothyroxine (SYNTHROID) 125 MCG tablet       Please further assist

## 2024-12-06 NOTE — TELEPHONE ENCOUNTER
She should be on the 125 mcg dose?    Last visit we had she was using 112 mcg and her thyroid function was under active so I switched it to the 125 mcg.  Is there a reason she went back to the 112 mcg?

## 2024-12-23 DIAGNOSIS — E03.9 ACQUIRED HYPOTHYROIDISM: ICD-10-CM

## 2024-12-23 RX ORDER — LEVOTHYROXINE SODIUM 125 UG/1
125 TABLET ORAL
Qty: 90 TABLET | Refills: 0 | Status: SHIPPED | OUTPATIENT
Start: 2024-12-23

## 2024-12-23 SDOH — HEALTH STABILITY: PHYSICAL HEALTH: ON AVERAGE, HOW MANY DAYS PER WEEK DO YOU ENGAGE IN MODERATE TO STRENUOUS EXERCISE (LIKE A BRISK WALK)?: 0 DAYS

## 2024-12-23 ASSESSMENT — PATIENT HEALTH QUESTIONNAIRE - PHQ9
SUM OF ALL RESPONSES TO PHQ QUESTIONS 1-9: 2
2. FEELING DOWN, DEPRESSED OR HOPELESS: SEVERAL DAYS
SUM OF ALL RESPONSES TO PHQ QUESTIONS 1-9: 2
SUM OF ALL RESPONSES TO PHQ QUESTIONS 1-9: 2
SUM OF ALL RESPONSES TO PHQ9 QUESTIONS 1 & 2: 2
1. LITTLE INTEREST OR PLEASURE IN DOING THINGS: SEVERAL DAYS
SUM OF ALL RESPONSES TO PHQ QUESTIONS 1-9: 2

## 2024-12-23 ASSESSMENT — LIFESTYLE VARIABLES
HOW OFTEN DO YOU HAVE A DRINK CONTAINING ALCOHOL: 98
HOW OFTEN DO YOU HAVE SIX OR MORE DRINKS ON ONE OCCASION: 1
HOW OFTEN DO YOU HAVE A DRINK CONTAINING ALCOHOL: PATIENT DECLINED
HOW MANY STANDARD DRINKS CONTAINING ALCOHOL DO YOU HAVE ON A TYPICAL DAY: 98
HOW MANY STANDARD DRINKS CONTAINING ALCOHOL DO YOU HAVE ON A TYPICAL DAY: PATIENT DECLINED

## 2024-12-27 RX ORDER — LEVOTHYROXINE SODIUM 112 UG/1
TABLET ORAL
COMMUNITY
Start: 2024-12-12 | End: 2024-12-31

## 2024-12-30 ENCOUNTER — OFFICE VISIT (OUTPATIENT)
Dept: PRIMARY CARE CLINIC | Facility: CLINIC | Age: 79
End: 2024-12-30
Payer: MEDICARE

## 2024-12-30 VITALS
RESPIRATION RATE: 16 BRPM | OXYGEN SATURATION: 96 % | HEIGHT: 64 IN | DIASTOLIC BLOOD PRESSURE: 58 MMHG | TEMPERATURE: 98.3 F | HEART RATE: 91 BPM | WEIGHT: 156 LBS | BODY MASS INDEX: 26.63 KG/M2 | SYSTOLIC BLOOD PRESSURE: 124 MMHG

## 2024-12-30 DIAGNOSIS — F41.1 GENERALIZED ANXIETY DISORDER: ICD-10-CM

## 2024-12-30 DIAGNOSIS — I10 PRIMARY HYPERTENSION: Chronic | ICD-10-CM

## 2024-12-30 DIAGNOSIS — E03.9 ACQUIRED HYPOTHYROIDISM: ICD-10-CM

## 2024-12-30 DIAGNOSIS — B00.9 HSV-2 INFECTION: ICD-10-CM

## 2024-12-30 DIAGNOSIS — Z00.00 MEDICARE ANNUAL WELLNESS VISIT, SUBSEQUENT: Primary | ICD-10-CM

## 2024-12-30 DIAGNOSIS — F33.1 MODERATE EPISODE OF RECURRENT MAJOR DEPRESSIVE DISORDER (HCC): ICD-10-CM

## 2024-12-30 PROCEDURE — G0439 PPPS, SUBSEQ VISIT: HCPCS | Performed by: NURSE PRACTITIONER

## 2024-12-30 PROCEDURE — 1123F ACP DISCUSS/DSCN MKR DOCD: CPT | Performed by: NURSE PRACTITIONER

## 2024-12-30 PROCEDURE — 1159F MED LIST DOCD IN RCRD: CPT | Performed by: NURSE PRACTITIONER

## 2024-12-30 PROCEDURE — 1090F PRES/ABSN URINE INCON ASSESS: CPT | Performed by: NURSE PRACTITIONER

## 2024-12-30 PROCEDURE — 1036F TOBACCO NON-USER: CPT | Performed by: NURSE PRACTITIONER

## 2024-12-30 PROCEDURE — G8399 PT W/DXA RESULTS DOCUMENT: HCPCS | Performed by: NURSE PRACTITIONER

## 2024-12-30 PROCEDURE — 3078F DIAST BP <80 MM HG: CPT | Performed by: NURSE PRACTITIONER

## 2024-12-30 PROCEDURE — 99214 OFFICE O/P EST MOD 30 MIN: CPT | Performed by: NURSE PRACTITIONER

## 2024-12-30 PROCEDURE — G8484 FLU IMMUNIZE NO ADMIN: HCPCS | Performed by: NURSE PRACTITIONER

## 2024-12-30 PROCEDURE — 3074F SYST BP LT 130 MM HG: CPT | Performed by: NURSE PRACTITIONER

## 2024-12-30 PROCEDURE — G8419 CALC BMI OUT NRM PARAM NOF/U: HCPCS | Performed by: NURSE PRACTITIONER

## 2024-12-30 PROCEDURE — 1160F RVW MEDS BY RX/DR IN RCRD: CPT | Performed by: NURSE PRACTITIONER

## 2024-12-30 PROCEDURE — 1126F AMNT PAIN NOTED NONE PRSNT: CPT | Performed by: NURSE PRACTITIONER

## 2024-12-30 PROCEDURE — G8427 DOCREV CUR MEDS BY ELIG CLIN: HCPCS | Performed by: NURSE PRACTITIONER

## 2024-12-30 RX ORDER — BUPROPION HYDROCHLORIDE 150 MG/1
150 TABLET ORAL EVERY MORNING
Qty: 90 TABLET | Refills: 3 | Status: SHIPPED | OUTPATIENT
Start: 2024-12-30

## 2024-12-30 RX ORDER — DOXAZOSIN 2 MG/1
2 TABLET ORAL NIGHTLY
Qty: 90 TABLET | Refills: 3 | Status: SHIPPED | OUTPATIENT
Start: 2024-12-30

## 2024-12-30 RX ORDER — VALACYCLOVIR HYDROCHLORIDE 500 MG/1
TABLET, FILM COATED ORAL
Qty: 30 TABLET | Refills: 1 | Status: SHIPPED | OUTPATIENT
Start: 2024-12-30

## 2024-12-30 RX ORDER — ONDANSETRON 8 MG/1
TABLET, FILM COATED ORAL
COMMUNITY
Start: 2024-11-17

## 2024-12-30 RX ORDER — VENLAFAXINE HYDROCHLORIDE 75 MG/1
75 CAPSULE, EXTENDED RELEASE ORAL DAILY
Qty: 90 CAPSULE | Refills: 3 | Status: SHIPPED | OUTPATIENT
Start: 2024-12-30

## 2024-12-30 ASSESSMENT — PATIENT HEALTH QUESTIONNAIRE - PHQ9
7. TROUBLE CONCENTRATING ON THINGS, SUCH AS READING THE NEWSPAPER OR WATCHING TELEVISION: NOT AT ALL
3. TROUBLE FALLING OR STAYING ASLEEP: NOT AT ALL
SUM OF ALL RESPONSES TO PHQ QUESTIONS 1-9: 0
6. FEELING BAD ABOUT YOURSELF - OR THAT YOU ARE A FAILURE OR HAVE LET YOURSELF OR YOUR FAMILY DOWN: NOT AT ALL
2. FEELING DOWN, DEPRESSED OR HOPELESS: NOT AT ALL
5. POOR APPETITE OR OVEREATING: NOT AT ALL
8. MOVING OR SPEAKING SO SLOWLY THAT OTHER PEOPLE COULD HAVE NOTICED. OR THE OPPOSITE, BEING SO FIGETY OR RESTLESS THAT YOU HAVE BEEN MOVING AROUND A LOT MORE THAN USUAL: NOT AT ALL
4. FEELING TIRED OR HAVING LITTLE ENERGY: NOT AT ALL
10. IF YOU CHECKED OFF ANY PROBLEMS, HOW DIFFICULT HAVE THESE PROBLEMS MADE IT FOR YOU TO DO YOUR WORK, TAKE CARE OF THINGS AT HOME, OR GET ALONG WITH OTHER PEOPLE: NOT DIFFICULT AT ALL
SUM OF ALL RESPONSES TO PHQ QUESTIONS 1-9: 0
9. THOUGHTS THAT YOU WOULD BE BETTER OFF DEAD, OR OF HURTING YOURSELF: NOT AT ALL
SUM OF ALL RESPONSES TO PHQ9 QUESTIONS 1 & 2: 0
1. LITTLE INTEREST OR PLEASURE IN DOING THINGS: NOT AT ALL
SUM OF ALL RESPONSES TO PHQ QUESTIONS 1-9: 0
SUM OF ALL RESPONSES TO PHQ QUESTIONS 1-9: 0

## 2024-12-30 NOTE — PROGRESS NOTES
Medicare Annual Wellness Visit    Mari Centeno is here for Medicare AWV    Assessment & Plan   Medicare annual wellness visit, subsequent  Moderate episode of recurrent major depressive disorder (HCC)  Comments:  stable on current medication  Orders:  -     buPROPion (WELLBUTRIN XL) 150 MG extended release tablet; Take 1 tablet by mouth every morning, Disp-90 tablet, R-3Normal  -     venlafaxine (EFFEXOR XR) 75 MG extended release capsule; Take 1 capsule by mouth daily, Disp-90 capsule, R-3Normal  Generalized anxiety disorder  Comments:  stable on current medication  Orders:  -     buPROPion (WELLBUTRIN XL) 150 MG extended release tablet; Take 1 tablet by mouth every morning, Disp-90 tablet, R-3Normal  -     venlafaxine (EFFEXOR XR) 75 MG extended release capsule; Take 1 capsule by mouth daily, Disp-90 capsule, R-3Normal  Primary hypertension  Comments:  well controlled on current medications.  Orders:  -     doxazosin (CARDURA) 2 MG tablet; Take 1 tablet by mouth nightly, Disp-90 tablet, R-3Normal  -     Comprehensive Metabolic Panel  HSV-2 infection  -     valACYclovir (VALTREX) 500 MG tablet; TAKE 1 TABLET BY MOUTH TWICE DAILY FOR 3 DAYS. MAX 6 DOSES PER OUTBREAK, Disp-30 tablet, R-1Normal  Acquired hypothyroidism  Comments:  unclear control..  will check labs and adjust dose pending.  Orders:  -     TSH + Free T4 Panel     Recommendations for Preventive Services Due: see orders and patient instructions/AVS.  Recommended screening schedule for the next 5-10 years is provided to the patient in written form: see Patient Instructions/AVS.     Return in 6 months (on 6/30/2025).     Subjective   The following acute and/or chronic problems were also addressed today:    Hypertension: Patients hypertension is well controlled on regimen of doxazosin. Denies headaches, blurred vision or dizziness.       Hypothyroidism:  Patient's TSH was high at last visit, switched to 125 mcg dose however patient notes that she

## 2024-12-30 NOTE — PROGRESS NOTES
Chief Complaint   Patient presents with    Medicare AWV     BP (!) 124/58 (Site: Right Upper Arm, Position: Sitting)   Pulse 91   Temp 98.3 °F (36.8 °C) (Oral)   Resp 16   Ht 1.626 m (5' 4\")   Wt 70.8 kg (156 lb)   SpO2 96%   BMI 26.78 kg/m²     \"Have you been to the ER, urgent care clinic since your last visit?  Hospitalized since your last visit?\"    NO    “Have you seen or consulted any other health care providers outside our system since your last visit?”    NO

## 2024-12-31 LAB
ALBUMIN SERPL-MCNC: 3.8 G/DL (ref 3.7–4.7)
ALP SERPL-CCNC: 111 IU/L (ref 44–121)
ALT SERPL-CCNC: 9 IU/L (ref 0–32)
AST SERPL-CCNC: 28 IU/L (ref 0–40)
BILIRUB SERPL-MCNC: 0.4 MG/DL (ref 0–1.2)
BUN SERPL-MCNC: 19 MG/DL (ref 8–27)
BUN/CREAT SERPL: 14 (ref 12–28)
CALCIUM SERPL-MCNC: 8.3 MG/DL (ref 8.7–10.3)
CHLORIDE SERPL-SCNC: 100 MMOL/L (ref 96–106)
CO2 SERPL-SCNC: 22 MMOL/L (ref 20–29)
CREAT SERPL-MCNC: 1.33 MG/DL (ref 0.57–1)
EGFRCR SERPLBLD CKD-EPI 2021: 40 ML/MIN/1.73
GLOBULIN SER CALC-MCNC: 1.7 G/DL (ref 1.5–4.5)
GLUCOSE SERPL-MCNC: 115 MG/DL (ref 70–99)
POTASSIUM SERPL-SCNC: 3.6 MMOL/L (ref 3.5–5.2)
PROT SERPL-MCNC: 5.5 G/DL (ref 6–8.5)
SODIUM SERPL-SCNC: 134 MMOL/L (ref 134–144)
T4 FREE SERPL-MCNC: 1.65 NG/DL (ref 0.82–1.77)
TSH SERPL DL<=0.005 MIU/L-ACNC: 8.59 UIU/ML (ref 0.45–4.5)

## 2024-12-31 RX ORDER — LEVOTHYROXINE SODIUM 125 UG/1
125 TABLET ORAL
Qty: 90 TABLET | Refills: 0 | Status: SHIPPED | OUTPATIENT
Start: 2024-12-31

## 2025-01-16 ENCOUNTER — TELEPHONE (OUTPATIENT)
Dept: PRIMARY CARE CLINIC | Facility: CLINIC | Age: 80
End: 2025-01-16

## 2025-01-16 NOTE — TELEPHONE ENCOUNTER
----- Message from Fanny COX sent at 1/15/2025  1:33 PM EST -----  Regarding: E[facility] Appointment Request  E[facility] Appointment Request    Patient needs appointment for E[facility] Appointment Type: Existing Condition Follow Up.    Patient Requested Dates(s): Middle of month of February  Patient Requested Time: Afternoon  Provider Name: Nelda Morse APRN - NP    Reason for Appointment Request: Established Patient - Available appointments did not meet patient need  --------------------------------------------------------------------------------------------------------------------------    Relationship to Patient: Self     Call Back Information: OK to leave message on voicemail  Preferred Call Back Number: Phone [phone]

## 2025-01-16 NOTE — TELEPHONE ENCOUNTER
I wished to see her back for her thyroid 6 weeks from previous visit.  You can use a flex 5 to get her scheduled. Thanks!

## 2025-02-09 SDOH — ECONOMIC STABILITY: FOOD INSECURITY: WITHIN THE PAST 12 MONTHS, YOU WORRIED THAT YOUR FOOD WOULD RUN OUT BEFORE YOU GOT MONEY TO BUY MORE.: NEVER TRUE

## 2025-02-09 SDOH — ECONOMIC STABILITY: INCOME INSECURITY: IN THE LAST 12 MONTHS, WAS THERE A TIME WHEN YOU WERE NOT ABLE TO PAY THE MORTGAGE OR RENT ON TIME?: NO

## 2025-02-09 SDOH — ECONOMIC STABILITY: FOOD INSECURITY: WITHIN THE PAST 12 MONTHS, THE FOOD YOU BOUGHT JUST DIDN'T LAST AND YOU DIDN'T HAVE MONEY TO GET MORE.: NEVER TRUE

## 2025-02-09 SDOH — ECONOMIC STABILITY: TRANSPORTATION INSECURITY
IN THE PAST 12 MONTHS, HAS THE LACK OF TRANSPORTATION KEPT YOU FROM MEDICAL APPOINTMENTS OR FROM GETTING MEDICATIONS?: NO

## 2025-02-12 ENCOUNTER — TELEPHONE (OUTPATIENT)
Dept: PRIMARY CARE CLINIC | Facility: CLINIC | Age: 80
End: 2025-02-12

## 2025-02-12 NOTE — TELEPHONE ENCOUNTER
----- Message from tod EDWARDS sent at 2/12/2025 11:18 AM EST -----  Regarding: ECC Appointment Request  ECC Appointment Request    Patient needs appointment for ECC Appointment Type: Existing Condition Follow Up.    Patient Requested Dates(s):as soon as possible for for February   Patient Requested Time:afternoon appointment   Provider Name:Lito CrystalDIONI Quinones NP     Reason for Appointment Request: Established Patient - Available appointments did not meet patient need     The patient will have an appointment on Jun 25, 2025 at 1:30 PM but needs earlier appointmetnt    --------------------------------------------------------------------------------------------------------------------------    Relationship to Patient: Self     Call Back Information: OK to leave message on voicemail  Preferred Call Back Number: Phone [phone]

## 2025-02-12 NOTE — TELEPHONE ENCOUNTER
Would like to see her sooner for thyroid follow up.  Please use flex 5 to get her scheduled.  thanks

## 2025-03-18 ENCOUNTER — OFFICE VISIT (OUTPATIENT)
Dept: PRIMARY CARE CLINIC | Facility: CLINIC | Age: 80
End: 2025-03-18
Payer: MEDICARE

## 2025-03-18 VITALS
TEMPERATURE: 98.1 F | HEART RATE: 89 BPM | DIASTOLIC BLOOD PRESSURE: 62 MMHG | SYSTOLIC BLOOD PRESSURE: 130 MMHG | OXYGEN SATURATION: 96 % | HEIGHT: 62 IN | RESPIRATION RATE: 16 BRPM | WEIGHT: 152 LBS | BODY MASS INDEX: 27.97 KG/M2

## 2025-03-18 DIAGNOSIS — E03.9 ACQUIRED HYPOTHYROIDISM: Primary | ICD-10-CM

## 2025-03-18 DIAGNOSIS — E53.8 B12 DEFICIENCY: ICD-10-CM

## 2025-03-18 DIAGNOSIS — C92.10 CML (CHRONIC MYELOID LEUKEMIA) (HCC): ICD-10-CM

## 2025-03-18 DIAGNOSIS — F33.1 MODERATE EPISODE OF RECURRENT MAJOR DEPRESSIVE DISORDER (HCC): ICD-10-CM

## 2025-03-18 DIAGNOSIS — I50.32 CHF (CONGESTIVE HEART FAILURE), NYHA CLASS I, CHRONIC, DIASTOLIC (HCC): ICD-10-CM

## 2025-03-18 DIAGNOSIS — L21.9 SEBORRHEIC DERMATITIS: ICD-10-CM

## 2025-03-18 PROCEDURE — 1090F PRES/ABSN URINE INCON ASSESS: CPT | Performed by: NURSE PRACTITIONER

## 2025-03-18 PROCEDURE — 3075F SYST BP GE 130 - 139MM HG: CPT | Performed by: NURSE PRACTITIONER

## 2025-03-18 PROCEDURE — 3078F DIAST BP <80 MM HG: CPT | Performed by: NURSE PRACTITIONER

## 2025-03-18 PROCEDURE — G8399 PT W/DXA RESULTS DOCUMENT: HCPCS | Performed by: NURSE PRACTITIONER

## 2025-03-18 PROCEDURE — 1036F TOBACCO NON-USER: CPT | Performed by: NURSE PRACTITIONER

## 2025-03-18 PROCEDURE — 1159F MED LIST DOCD IN RCRD: CPT | Performed by: NURSE PRACTITIONER

## 2025-03-18 PROCEDURE — 1160F RVW MEDS BY RX/DR IN RCRD: CPT | Performed by: NURSE PRACTITIONER

## 2025-03-18 PROCEDURE — 1123F ACP DISCUSS/DSCN MKR DOCD: CPT | Performed by: NURSE PRACTITIONER

## 2025-03-18 PROCEDURE — G8419 CALC BMI OUT NRM PARAM NOF/U: HCPCS | Performed by: NURSE PRACTITIONER

## 2025-03-18 PROCEDURE — 99214 OFFICE O/P EST MOD 30 MIN: CPT | Performed by: NURSE PRACTITIONER

## 2025-03-18 PROCEDURE — G8427 DOCREV CUR MEDS BY ELIG CLIN: HCPCS | Performed by: NURSE PRACTITIONER

## 2025-03-18 PROCEDURE — 1126F AMNT PAIN NOTED NONE PRSNT: CPT | Performed by: NURSE PRACTITIONER

## 2025-03-18 RX ORDER — CLOBETASOL PROPIONATE 0.05 G/100ML
SHAMPOO TOPICAL
Qty: 118 ML | Refills: 0 | Status: SHIPPED | OUTPATIENT
Start: 2025-03-18

## 2025-03-18 ASSESSMENT — PATIENT HEALTH QUESTIONNAIRE - PHQ9
1. LITTLE INTEREST OR PLEASURE IN DOING THINGS: NOT AT ALL
5. POOR APPETITE OR OVEREATING: NOT AT ALL
4. FEELING TIRED OR HAVING LITTLE ENERGY: NOT AT ALL
10. IF YOU CHECKED OFF ANY PROBLEMS, HOW DIFFICULT HAVE THESE PROBLEMS MADE IT FOR YOU TO DO YOUR WORK, TAKE CARE OF THINGS AT HOME, OR GET ALONG WITH OTHER PEOPLE: NOT DIFFICULT AT ALL
SUM OF ALL RESPONSES TO PHQ QUESTIONS 1-9: 0
3. TROUBLE FALLING OR STAYING ASLEEP: NOT AT ALL
SUM OF ALL RESPONSES TO PHQ QUESTIONS 1-9: 0
6. FEELING BAD ABOUT YOURSELF - OR THAT YOU ARE A FAILURE OR HAVE LET YOURSELF OR YOUR FAMILY DOWN: NOT AT ALL
SUM OF ALL RESPONSES TO PHQ QUESTIONS 1-9: 0
2. FEELING DOWN, DEPRESSED OR HOPELESS: NOT AT ALL
7. TROUBLE CONCENTRATING ON THINGS, SUCH AS READING THE NEWSPAPER OR WATCHING TELEVISION: NOT AT ALL
9. THOUGHTS THAT YOU WOULD BE BETTER OFF DEAD, OR OF HURTING YOURSELF: NOT AT ALL
SUM OF ALL RESPONSES TO PHQ QUESTIONS 1-9: 0
8. MOVING OR SPEAKING SO SLOWLY THAT OTHER PEOPLE COULD HAVE NOTICED. OR THE OPPOSITE, BEING SO FIGETY OR RESTLESS THAT YOU HAVE BEEN MOVING AROUND A LOT MORE THAN USUAL: NOT AT ALL

## 2025-03-18 ASSESSMENT — ENCOUNTER SYMPTOMS: NAUSEA: 0

## 2025-03-18 NOTE — PROGRESS NOTES
Chief Complaint   Patient presents with    Follow-up     Thyroid     /62 (BP Site: Left Upper Arm, Patient Position: Sitting)   Pulse 89   Temp 98.1 °F (36.7 °C) (Oral)   Resp 16   Ht 1.575 m (5' 2\")   Wt 68.9 kg (152 lb)   SpO2 96%   BMI 27.80 kg/m²     \"Have you been to the ER, urgent care clinic since your last visit?  Hospitalized since your last visit?\"    NO    “Have you seen or consulted any other health care providers outside our system since your last visit?”    NO

## 2025-03-18 NOTE — PROGRESS NOTES
Mari Centeno is a 79 y.o. female who was seen in clinic today (3/18/2025).    Assessment & Plan:   Below is the assessment and plan developed based on review of pertinent history, physical exam, labs, studies, and medications.    1. Acquired hypothyroidism  Comments:  unclear control, will check labs and adjust dose pending results.  Orders:  -     TSH + Free T4 Panel  2. CML (chronic myeloid leukemia) ([facility])  Assessment & Plan:   Monitored by specialist- no acute findings meriting change in the plan  3. CHF (congestive heart failure), NYHA class I, chronic, diastolic ([facility])  Assessment & Plan:   Monitored by specialist- no acute findings meriting change in the plan  4. Moderate episode of recurrent major depressive disorder ([facility])  Assessment & Plan:   Chronic, at goal (stable), continue current treatment plan  5. Seborrheic dermatitis  Comments:  will treat with clobetasol  Orders:  -     Clobetasol Propionate 0.05 % SHAM; Apply topically to scalp once a day then rinse., Disp-118 mL, R-0Normal  6. B12 deficiency  Comments:  hx of same, was previously on injections.  will check labs.  Orders:  -     Vitamin B12      No follow-ups on file.    Subjective:   Mari was seen today for Follow-up (Thyroid)     Patient was seen in office x3 months ago.  Patient had TSH that was at 10.  Synthroid was increased to 125 mcg.   Patient reported that she is tolerating this well.  Denies chest pain, palpitations, anxiety, insomnia, appetite or weight changes.       Scalp itching: Patient reported that she is following with dermatology for scalp dermatitis, using clindamycin which helps mildly but she is applying 5 times a day for relief.   Has tried numerous topical shampoos without improvement.     Review of Systems   Constitutional:  Negative for activity change, fatigue and unexpected weight change.   Cardiovascular:  Negative for chest pain.   Gastrointestinal:  Negative for nausea.   Skin:  Positive for rash (itchy

## 2025-03-20 ENCOUNTER — RESULTS FOLLOW-UP (OUTPATIENT)
Dept: PRIMARY CARE CLINIC | Facility: CLINIC | Age: 80
End: 2025-03-20

## 2025-03-20 LAB
T4 FREE SERPL-MCNC: 1.92 NG/DL (ref 0.82–1.77)
TSH SERPL DL<=0.005 MIU/L-ACNC: 2.51 UIU/ML (ref 0.45–4.5)
VIT B12 SERPL-MCNC: 194 PG/ML (ref 232–1245)

## 2025-06-16 ENCOUNTER — TELEPHONE (OUTPATIENT)
Dept: PRIMARY CARE CLINIC | Facility: CLINIC | Age: 80
End: 2025-06-16

## 2025-06-16 DIAGNOSIS — I10 PRIMARY HYPERTENSION: Chronic | ICD-10-CM

## 2025-06-16 RX ORDER — DOXAZOSIN 2 MG/1
2 TABLET ORAL NIGHTLY
Qty: 90 TABLET | Refills: 3 | Status: SHIPPED | OUTPATIENT
Start: 2025-06-16

## 2025-06-16 NOTE — TELEPHONE ENCOUNTER
Patient needs a  rx refill on rx Doxazosin 2 mg tab send to [facility] pharmacy in [place]

## 2025-07-21 ENCOUNTER — TELEPHONE (OUTPATIENT)
Dept: PRIMARY CARE CLINIC | Facility: CLINIC | Age: 80
End: 2025-07-21

## 2025-07-21 DIAGNOSIS — E03.9 ACQUIRED HYPOTHYROIDISM: ICD-10-CM

## 2025-07-21 RX ORDER — LEVOTHYROXINE SODIUM 125 UG/1
125 TABLET ORAL
Qty: 90 TABLET | Refills: 0 | Status: SHIPPED | OUTPATIENT
Start: 2025-07-21

## 2025-07-21 NOTE — TELEPHONE ENCOUNTER
Patient is requesting a rx refill on levothyroxine 125 mg to [facility] pharmacy on [address] in [place]